# Patient Record
Sex: FEMALE | Race: WHITE | NOT HISPANIC OR LATINO
[De-identification: names, ages, dates, MRNs, and addresses within clinical notes are randomized per-mention and may not be internally consistent; named-entity substitution may affect disease eponyms.]

---

## 2017-02-13 ENCOUNTER — APPOINTMENT (OUTPATIENT)
Dept: GYNECOLOGIC ONCOLOGY | Facility: CLINIC | Age: 62
End: 2017-02-13

## 2017-02-13 VITALS
SYSTOLIC BLOOD PRESSURE: 122 MMHG | DIASTOLIC BLOOD PRESSURE: 84 MMHG | HEIGHT: 65 IN | BODY MASS INDEX: 31.49 KG/M2 | WEIGHT: 189 LBS

## 2017-02-14 ENCOUNTER — OTHER (OUTPATIENT)
Age: 62
End: 2017-02-14

## 2017-02-14 LAB — CANCER AG125 SERPL-ACNC: 24 U/ML

## 2017-02-15 ENCOUNTER — OTHER (OUTPATIENT)
Age: 62
End: 2017-02-15

## 2017-03-15 ENCOUNTER — APPOINTMENT (OUTPATIENT)
Dept: GYNECOLOGIC ONCOLOGY | Facility: CLINIC | Age: 62
End: 2017-03-15

## 2017-04-17 ENCOUNTER — RX RENEWAL (OUTPATIENT)
Age: 62
End: 2017-04-17

## 2017-04-20 ENCOUNTER — OTHER (OUTPATIENT)
Age: 62
End: 2017-04-20

## 2017-04-21 ENCOUNTER — OTHER (OUTPATIENT)
Age: 62
End: 2017-04-21

## 2017-04-21 RX ORDER — CLOBETASOL PROPIONATE 0.5 MG/G
0.05 AEROSOL, FOAM TOPICAL DAILY
Qty: 30 | Refills: 0 | Status: DISCONTINUED | COMMUNITY
Start: 2017-02-15 | End: 2017-04-21

## 2017-06-12 ENCOUNTER — APPOINTMENT (OUTPATIENT)
Dept: GYNECOLOGIC ONCOLOGY | Facility: CLINIC | Age: 62
End: 2017-06-12

## 2017-06-12 VITALS
WEIGHT: 188 LBS | BODY MASS INDEX: 31.32 KG/M2 | HEIGHT: 65 IN | SYSTOLIC BLOOD PRESSURE: 124 MMHG | DIASTOLIC BLOOD PRESSURE: 82 MMHG

## 2017-06-14 LAB — CANCER AG125 SERPL-ACNC: 22 U/ML

## 2017-08-10 ENCOUNTER — RX RENEWAL (OUTPATIENT)
Age: 62
End: 2017-08-10

## 2017-08-11 ENCOUNTER — RX RENEWAL (OUTPATIENT)
Age: 62
End: 2017-08-11

## 2017-09-12 ENCOUNTER — APPOINTMENT (OUTPATIENT)
Dept: GYNECOLOGIC ONCOLOGY | Facility: CLINIC | Age: 62
End: 2017-09-12
Payer: COMMERCIAL

## 2017-09-12 VITALS
HEIGHT: 65 IN | BODY MASS INDEX: 32.49 KG/M2 | WEIGHT: 195 LBS | DIASTOLIC BLOOD PRESSURE: 88 MMHG | SYSTOLIC BLOOD PRESSURE: 138 MMHG

## 2017-09-12 PROCEDURE — 99214 OFFICE O/P EST MOD 30 MIN: CPT

## 2017-09-12 RX ORDER — ALPRAZOLAM 0.25 MG/1
0.25 TABLET ORAL
Qty: 30 | Refills: 0 | Status: DISCONTINUED | COMMUNITY
Start: 2016-09-07 | End: 2017-09-12

## 2017-09-14 LAB — CANCER AG125 SERPL-ACNC: 22 U/ML

## 2018-02-07 ENCOUNTER — APPOINTMENT (OUTPATIENT)
Dept: GYNECOLOGIC ONCOLOGY | Facility: CLINIC | Age: 63
End: 2018-02-07
Payer: MEDICAID

## 2018-02-07 VITALS
DIASTOLIC BLOOD PRESSURE: 90 MMHG | BODY MASS INDEX: 32.32 KG/M2 | HEIGHT: 65 IN | SYSTOLIC BLOOD PRESSURE: 142 MMHG | WEIGHT: 194 LBS

## 2018-02-07 PROCEDURE — 99213 OFFICE O/P EST LOW 20 MIN: CPT

## 2018-02-08 LAB — CANCER AG125 SERPL-ACNC: 20 U/ML

## 2018-05-03 ENCOUNTER — EMERGENCY (EMERGENCY)
Facility: HOSPITAL | Age: 63
LOS: 1 days | End: 2018-05-03
Payer: MEDICAID

## 2018-05-03 DIAGNOSIS — Z98.89 OTHER SPECIFIED POSTPROCEDURAL STATES: Chronic | ICD-10-CM

## 2018-05-03 PROCEDURE — 99283 EMERGENCY DEPT VISIT LOW MDM: CPT

## 2018-06-20 ENCOUNTER — APPOINTMENT (OUTPATIENT)
Dept: GYNECOLOGIC ONCOLOGY | Facility: CLINIC | Age: 63
End: 2018-06-20
Payer: MEDICAID

## 2018-06-20 VITALS
WEIGHT: 193 LBS | HEIGHT: 65 IN | DIASTOLIC BLOOD PRESSURE: 98 MMHG | SYSTOLIC BLOOD PRESSURE: 134 MMHG | BODY MASS INDEX: 32.15 KG/M2

## 2018-06-20 DIAGNOSIS — R06.00 DYSPNEA, UNSPECIFIED: ICD-10-CM

## 2018-06-20 DIAGNOSIS — Z87.898 PERSONAL HISTORY OF OTHER SPECIFIED CONDITIONS: ICD-10-CM

## 2018-06-20 PROCEDURE — 99213 OFFICE O/P EST LOW 20 MIN: CPT

## 2018-06-21 LAB — CANCER AG125 SERPL-ACNC: 24 U/ML

## 2018-12-12 ENCOUNTER — APPOINTMENT (OUTPATIENT)
Dept: GYNECOLOGIC ONCOLOGY | Facility: CLINIC | Age: 63
End: 2018-12-12
Payer: MEDICAID

## 2018-12-12 VITALS
BODY MASS INDEX: 32.82 KG/M2 | SYSTOLIC BLOOD PRESSURE: 132 MMHG | HEIGHT: 65 IN | DIASTOLIC BLOOD PRESSURE: 88 MMHG | WEIGHT: 197 LBS

## 2018-12-12 PROCEDURE — 99214 OFFICE O/P EST MOD 30 MIN: CPT

## 2018-12-14 LAB — CANCER AG125 SERPL-ACNC: 23 U/ML

## 2019-02-15 ENCOUNTER — EMERGENCY (EMERGENCY)
Facility: HOSPITAL | Age: 64
LOS: 1 days | End: 2019-02-15
Admitting: EMERGENCY MEDICINE
Payer: COMMERCIAL

## 2019-02-15 DIAGNOSIS — Z98.89 OTHER SPECIFIED POSTPROCEDURAL STATES: Chronic | ICD-10-CM

## 2019-02-15 PROCEDURE — 99284 EMERGENCY DEPT VISIT MOD MDM: CPT | Mod: 25

## 2019-02-15 PROCEDURE — 99053 MED SERV 10PM-8AM 24 HR FAC: CPT

## 2019-02-15 PROCEDURE — 73562 X-RAY EXAM OF KNEE 3: CPT | Mod: 26,RT

## 2019-04-19 ENCOUNTER — APPOINTMENT (OUTPATIENT)
Dept: MAMMOGRAPHY | Facility: CLINIC | Age: 64
End: 2019-04-19
Payer: MEDICAID

## 2019-04-19 PROCEDURE — 77063 BREAST TOMOSYNTHESIS BI: CPT

## 2019-04-19 PROCEDURE — 77067 SCR MAMMO BI INCL CAD: CPT

## 2019-06-12 ENCOUNTER — APPOINTMENT (OUTPATIENT)
Dept: GYNECOLOGIC ONCOLOGY | Facility: CLINIC | Age: 64
End: 2019-06-12
Payer: MEDICAID

## 2019-06-12 VITALS
DIASTOLIC BLOOD PRESSURE: 78 MMHG | BODY MASS INDEX: 31.82 KG/M2 | HEIGHT: 66 IN | WEIGHT: 198 LBS | SYSTOLIC BLOOD PRESSURE: 114 MMHG

## 2019-06-12 PROCEDURE — 99213 OFFICE O/P EST LOW 20 MIN: CPT

## 2019-06-12 NOTE — HISTORY OF PRESENT ILLNESS
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 \par ELAP, SAM, BSO and staging 8/3/16.\par No adjuvant therapy. \par \par Returns for surveillance visit. \par Feels well, denies bleeding, pain or other symptoms of disease. \par \par Last mammogram April 2019 was BIRADS 1. \par \par  = 23 12/12/18

## 2019-06-12 NOTE — PHYSICAL EXAM
[Absent] : Adnexa(ae): Absent [Normal] : Recto-Vaginal Exam: Normal [Fully active, able to carry on all pre-disease performance without restriction] : Status 0 - Fully active, able to carry on all pre-disease performance without restriction [de-identified] : well healed vertical midline scar

## 2019-06-14 LAB — CANCER AG125 SERPL-ACNC: 20 U/ML

## 2019-06-27 ENCOUNTER — OUTPATIENT (OUTPATIENT)
Dept: OUTPATIENT SERVICES | Facility: HOSPITAL | Age: 64
LOS: 1 days | Discharge: HOME | End: 2019-06-27
Payer: COMMERCIAL

## 2019-06-27 VITALS
DIASTOLIC BLOOD PRESSURE: 84 MMHG | WEIGHT: 202.38 LBS | SYSTOLIC BLOOD PRESSURE: 157 MMHG | RESPIRATION RATE: 18 BRPM | TEMPERATURE: 98 F | HEART RATE: 73 BPM | OXYGEN SATURATION: 98 % | HEIGHT: 66 IN

## 2019-06-27 DIAGNOSIS — Z90.710 ACQUIRED ABSENCE OF BOTH CERVIX AND UTERUS: Chronic | ICD-10-CM

## 2019-06-27 DIAGNOSIS — R19.4 CHANGE IN BOWEL HABIT: ICD-10-CM

## 2019-06-27 DIAGNOSIS — Z98.890 OTHER SPECIFIED POSTPROCEDURAL STATES: Chronic | ICD-10-CM

## 2019-06-27 DIAGNOSIS — Z98.89 OTHER SPECIFIED POSTPROCEDURAL STATES: Chronic | ICD-10-CM

## 2019-06-27 LAB
ANION GAP SERPL CALC-SCNC: 8 MMOL/L — SIGNIFICANT CHANGE UP (ref 5–17)
BASOPHILS # BLD AUTO: 0.03 K/UL — SIGNIFICANT CHANGE UP (ref 0–0.2)
BASOPHILS NFR BLD AUTO: 0.7 % — SIGNIFICANT CHANGE UP (ref 0–2)
BUN SERPL-MCNC: 13 MG/DL — SIGNIFICANT CHANGE UP (ref 7–23)
CALCIUM SERPL-MCNC: 10.9 MG/DL — HIGH (ref 8.5–10.1)
CHLORIDE SERPL-SCNC: 107 MMOL/L — SIGNIFICANT CHANGE UP (ref 96–108)
CO2 SERPL-SCNC: 26 MMOL/L — SIGNIFICANT CHANGE UP (ref 22–31)
CREAT SERPL-MCNC: 0.6 MG/DL — SIGNIFICANT CHANGE UP (ref 0.5–1.3)
EOSINOPHIL # BLD AUTO: 0.12 K/UL — SIGNIFICANT CHANGE UP (ref 0–0.5)
EOSINOPHIL NFR BLD AUTO: 2.7 % — SIGNIFICANT CHANGE UP (ref 0–6)
GLUCOSE SERPL-MCNC: 88 MG/DL — SIGNIFICANT CHANGE UP (ref 70–99)
HCT VFR BLD CALC: 40.4 % — SIGNIFICANT CHANGE UP (ref 34.5–45)
HGB BLD-MCNC: 13.6 G/DL — SIGNIFICANT CHANGE UP (ref 11.5–15.5)
IMM GRANULOCYTES NFR BLD AUTO: 0.2 % — SIGNIFICANT CHANGE UP (ref 0–1.5)
LYMPHOCYTES # BLD AUTO: 1.34 K/UL — SIGNIFICANT CHANGE UP (ref 1–3.3)
LYMPHOCYTES # BLD AUTO: 29.8 % — SIGNIFICANT CHANGE UP (ref 13–44)
MCHC RBC-ENTMCNC: 33 PG — SIGNIFICANT CHANGE UP (ref 27–34)
MCHC RBC-ENTMCNC: 33.7 GM/DL — SIGNIFICANT CHANGE UP (ref 32–36)
MCV RBC AUTO: 98.1 FL — SIGNIFICANT CHANGE UP (ref 80–100)
MONOCYTES # BLD AUTO: 0.37 K/UL — SIGNIFICANT CHANGE UP (ref 0–0.9)
MONOCYTES NFR BLD AUTO: 8.2 % — SIGNIFICANT CHANGE UP (ref 2–14)
NEUTROPHILS # BLD AUTO: 2.63 K/UL — SIGNIFICANT CHANGE UP (ref 1.8–7.4)
NEUTROPHILS NFR BLD AUTO: 58.4 % — SIGNIFICANT CHANGE UP (ref 43–77)
PLATELET # BLD AUTO: 168 K/UL — SIGNIFICANT CHANGE UP (ref 150–400)
POTASSIUM SERPL-MCNC: 4.8 MMOL/L — SIGNIFICANT CHANGE UP (ref 3.5–5.3)
POTASSIUM SERPL-SCNC: 4.8 MMOL/L — SIGNIFICANT CHANGE UP (ref 3.5–5.3)
RBC # BLD: 4.12 M/UL — SIGNIFICANT CHANGE UP (ref 3.8–5.2)
RBC # FLD: 12.1 % — SIGNIFICANT CHANGE UP (ref 10.3–14.5)
SODIUM SERPL-SCNC: 141 MMOL/L — SIGNIFICANT CHANGE UP (ref 135–145)
WBC # BLD: 4.5 K/UL — SIGNIFICANT CHANGE UP (ref 3.8–10.5)
WBC # FLD AUTO: 4.5 K/UL — SIGNIFICANT CHANGE UP (ref 3.8–10.5)

## 2019-06-27 PROCEDURE — 93010 ELECTROCARDIOGRAM REPORT: CPT

## 2019-06-27 NOTE — H&P PST ADULT - NSICDXPASTSURGICALHX_GEN_ALL_CORE_FT
PAST SURGICAL HISTORY:  s/p (R) ACL repair     s/p surgical correction of pleural effusion 30 years ago and Chest tube x15 days initially     S/P SAM-BSO 8/2016    S/P wrist surgery ORIF right wrist 2012

## 2019-06-27 NOTE — H&P PST ADULT - HISTORY OF PRESENT ILLNESS
This is a 65 y/o female with a PMH of HTN, ovarian cancer S/P SAM-BSO (no chemo or radiation), GERD who reports she had her last colonoscopy 5 years ago which was negative at that time. She is now C/O of some lower abdominal discomfort. She is scheduled for a follow up colonoscopy This is a 65 y/o female with a PMH of HTN, ovarian cancer S/P SAM-BSO (no chemo or radiation), GERD who reports she had her last colonoscopy 5 years ago which was negative at that time. She is now C/O of some lower abdominal discomfort. Denies any N/V/D or constipation. She is scheduled for a follow up colonoscopy This is a 65 y/o female with a PMH of HTN, ovarian cancer S/P SAM-BSO (no chemo or radiation), GERD who reports she had her last colonoscopy 4 years ago which was negative at that time. She is now C/O of some lower abdominal discomfort. Denies any N/V/ or constipation but does report some diarrhea at times.  She is scheduled for a follow up colonoscopy

## 2019-06-27 NOTE — H&P PST ADULT - NSICDXPASTMEDICALHX_GEN_ALL_CORE_FT
PAST MEDICAL HISTORY:  GERD (gastroesophageal reflux disease)     HTN (hypertension)     Hypothyroidism     Mass of ovary     Ovarian cancer S/P SAM-BSO no chemo or radiation  2016    Pleural effusion right lung, 1984    Right knee pain

## 2019-06-27 NOTE — H&P PST ADULT - ASSESSMENT
This is a 63 y/o female with lower abdominal pain who is scheduled for a for colonoscopy     Plan    1. NPO after midnight  2. Patient instructed to follow instructions from Dr. Barragan for bowel prep and day of procedure medications

## 2019-07-02 ENCOUNTER — RESULT REVIEW (OUTPATIENT)
Age: 64
End: 2019-07-02

## 2019-07-02 ENCOUNTER — OUTPATIENT (OUTPATIENT)
Dept: OUTPATIENT SERVICES | Facility: HOSPITAL | Age: 64
LOS: 1 days | Discharge: HOME | End: 2019-07-02
Payer: COMMERCIAL

## 2019-07-02 VITALS
TEMPERATURE: 99 F | DIASTOLIC BLOOD PRESSURE: 104 MMHG | OXYGEN SATURATION: 99 % | SYSTOLIC BLOOD PRESSURE: 154 MMHG | WEIGHT: 202.38 LBS | HEIGHT: 66 IN | RESPIRATION RATE: 19 BRPM | HEART RATE: 63 BPM

## 2019-07-02 DIAGNOSIS — Z90.710 ACQUIRED ABSENCE OF BOTH CERVIX AND UTERUS: Chronic | ICD-10-CM

## 2019-07-02 DIAGNOSIS — Z98.89 OTHER SPECIFIED POSTPROCEDURAL STATES: Chronic | ICD-10-CM

## 2019-07-02 PROCEDURE — 88305 TISSUE EXAM BY PATHOLOGIST: CPT | Mod: 26

## 2019-07-02 NOTE — ASU PATIENT PROFILE, ADULT - PMH
GERD (gastroesophageal reflux disease)    HTN (hypertension)    Hypothyroidism    Mass of ovary    Ovarian cancer  S/P SAM-BSO no chemo or radiation  2016  Pleural effusion  right lung, 1984  Right knee pain

## 2019-07-02 NOTE — ASU PATIENT PROFILE, ADULT - PSH
s/p (R) ACL repair    s/p surgical correction of pleural effusion 30 years ago and Chest tube x15 days initially    S/P SAM-BSO  8/2016  S/P wrist surgery  ORIF right wrist 2012

## 2019-07-05 LAB — SURGICAL PATHOLOGY STUDY: SIGNIFICANT CHANGE UP

## 2019-07-08 DIAGNOSIS — K57.30 DIVERTICULOSIS OF LARGE INTESTINE WITHOUT PERFORATION OR ABSCESS WITHOUT BLEEDING: ICD-10-CM

## 2019-07-08 DIAGNOSIS — I10 ESSENTIAL (PRIMARY) HYPERTENSION: ICD-10-CM

## 2019-07-08 DIAGNOSIS — K64.8 OTHER HEMORRHOIDS: ICD-10-CM

## 2019-07-08 DIAGNOSIS — E03.9 HYPOTHYROIDISM, UNSPECIFIED: ICD-10-CM

## 2019-07-08 DIAGNOSIS — Z88.5 ALLERGY STATUS TO NARCOTIC AGENT: ICD-10-CM

## 2019-07-08 DIAGNOSIS — K63.5 POLYP OF COLON: ICD-10-CM

## 2019-07-08 DIAGNOSIS — Z85.43 PERSONAL HISTORY OF MALIGNANT NEOPLASM OF OVARY: ICD-10-CM

## 2019-07-08 DIAGNOSIS — R19.7 DIARRHEA, UNSPECIFIED: ICD-10-CM

## 2019-07-08 DIAGNOSIS — K21.9 GASTRO-ESOPHAGEAL REFLUX DISEASE WITHOUT ESOPHAGITIS: ICD-10-CM

## 2019-07-08 DIAGNOSIS — Z87.891 PERSONAL HISTORY OF NICOTINE DEPENDENCE: ICD-10-CM

## 2019-08-15 ENCOUNTER — EMERGENCY (EMERGENCY)
Facility: HOSPITAL | Age: 64
LOS: 1 days | End: 2019-08-15
Admitting: SURGERY
Payer: COMMERCIAL

## 2019-08-15 DIAGNOSIS — Z98.89 OTHER SPECIFIED POSTPROCEDURAL STATES: Chronic | ICD-10-CM

## 2019-08-15 DIAGNOSIS — Z90.710 ACQUIRED ABSENCE OF BOTH CERVIX AND UTERUS: Chronic | ICD-10-CM

## 2019-08-15 PROCEDURE — 76705 ECHO EXAM OF ABDOMEN: CPT | Mod: 26

## 2019-08-15 PROCEDURE — 99284 EMERGENCY DEPT VISIT MOD MDM: CPT

## 2019-08-15 PROCEDURE — 71045 X-RAY EXAM CHEST 1 VIEW: CPT | Mod: 26

## 2019-08-15 PROCEDURE — 74018 RADEX ABDOMEN 1 VIEW: CPT | Mod: 26

## 2019-08-15 PROCEDURE — 93010 ELECTROCARDIOGRAM REPORT: CPT

## 2019-08-15 PROCEDURE — 74177 CT ABD & PELVIS W/CONTRAST: CPT | Mod: 26

## 2019-08-23 ENCOUNTER — OUTPATIENT (OUTPATIENT)
Dept: OUTPATIENT SERVICES | Facility: HOSPITAL | Age: 64
LOS: 1 days | End: 2019-08-23

## 2019-08-23 DIAGNOSIS — Z98.89 OTHER SPECIFIED POSTPROCEDURAL STATES: Chronic | ICD-10-CM

## 2019-08-23 DIAGNOSIS — Z90.710 ACQUIRED ABSENCE OF BOTH CERVIX AND UTERUS: Chronic | ICD-10-CM

## 2019-09-06 ENCOUNTER — OUTPATIENT (OUTPATIENT)
Dept: OUTPATIENT SERVICES | Facility: HOSPITAL | Age: 64
LOS: 1 days | End: 2019-09-06

## 2019-09-06 DIAGNOSIS — Z98.89 OTHER SPECIFIED POSTPROCEDURAL STATES: Chronic | ICD-10-CM

## 2019-09-06 DIAGNOSIS — Z90.710 ACQUIRED ABSENCE OF BOTH CERVIX AND UTERUS: Chronic | ICD-10-CM

## 2019-11-08 PROBLEM — C56.9 MALIGNANT NEOPLASM OF UNSPECIFIED OVARY: Chronic | Status: ACTIVE | Noted: 2019-06-27

## 2019-11-08 PROBLEM — M25.561 PAIN IN RIGHT KNEE: Chronic | Status: ACTIVE | Noted: 2019-06-27

## 2019-12-11 ENCOUNTER — APPOINTMENT (OUTPATIENT)
Dept: GYNECOLOGIC ONCOLOGY | Facility: CLINIC | Age: 64
End: 2019-12-11
Payer: MEDICAID

## 2019-12-11 VITALS — WEIGHT: 186 LBS | HEIGHT: 66 IN | BODY MASS INDEX: 29.89 KG/M2

## 2019-12-11 PROCEDURE — 99213 OFFICE O/P EST LOW 20 MIN: CPT

## 2019-12-11 NOTE — HISTORY OF PRESENT ILLNESS
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 \par ELAP, SAM, BSO and staging 8/3/16.\par No adjuvant therapy. \par \par Returns for surveillance visit. \par Feels well, denies bleeding, pain or other symptoms of disease. \par \par Last mammogram April 2019 was BIRADS 1. \par \par  = 20 6/14/19\par \par Had hernia surgery in September with robotic repair with mesh.

## 2019-12-11 NOTE — PHYSICAL EXAM
[Absent] : Adnexa(ae): Absent [Normal] : Anus and perineum: Normal sphincter tone, no masses, no prolapse. [Fully active, able to carry on all pre-disease performance without restriction] : Status 0 - Fully active, able to carry on all pre-disease performance without restriction [de-identified] : well healed vertical midline scar

## 2019-12-12 LAB — CANCER AG125 SERPL-ACNC: 17 U/ML

## 2019-12-29 DIAGNOSIS — M25.561 PAIN IN RIGHT KNEE: ICD-10-CM

## 2020-02-12 ENCOUNTER — APPOINTMENT (OUTPATIENT)
Dept: ORTHOPEDIC SURGERY | Facility: CLINIC | Age: 65
End: 2020-02-12

## 2020-06-17 ENCOUNTER — APPOINTMENT (OUTPATIENT)
Dept: GYNECOLOGIC ONCOLOGY | Facility: CLINIC | Age: 65
End: 2020-06-17
Payer: COMMERCIAL

## 2020-06-17 VITALS
HEIGHT: 66 IN | DIASTOLIC BLOOD PRESSURE: 95 MMHG | HEART RATE: 79 BPM | BODY MASS INDEX: 31.5 KG/M2 | SYSTOLIC BLOOD PRESSURE: 152 MMHG | WEIGHT: 196 LBS

## 2020-06-17 PROCEDURE — 99213 OFFICE O/P EST LOW 20 MIN: CPT

## 2020-06-17 NOTE — PHYSICAL EXAM
[Absent] : Adnexa(ae): Absent [Normal] : Recto-Vaginal Exam: Normal [Fully active, able to carry on all pre-disease performance without restriction] : Status 0 - Fully active, able to carry on all pre-disease performance without restriction [de-identified] : well healed vertical midline scar

## 2020-06-17 NOTE — HISTORY OF PRESENT ILLNESS
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 \par ELAP, SAM, BSO and staging 8/3/16.\par No adjuvant therapy. \par \par Returns for surveillance visit. \par Feels well, denies bleeding, pain or other symptoms of disease. \par \par Last mammogram April 2019 was BIRADS 1. \par \par  = 17 12/12/19

## 2020-06-18 LAB
CANCER AG125 SERPL-ACNC: 19 U/ML
SARS-COV-2 IGG SERPL IA-ACNC: 0.17 INDEX
SARS-COV-2 IGG SERPL QL IA: NEGATIVE

## 2020-06-24 ENCOUNTER — APPOINTMENT (OUTPATIENT)
Dept: MAMMOGRAPHY | Facility: CLINIC | Age: 65
End: 2020-06-24
Payer: COMMERCIAL

## 2020-06-24 ENCOUNTER — RESULT REVIEW (OUTPATIENT)
Age: 65
End: 2020-06-24

## 2020-06-24 PROCEDURE — 77063 BREAST TOMOSYNTHESIS BI: CPT

## 2020-06-24 PROCEDURE — 77067 SCR MAMMO BI INCL CAD: CPT

## 2020-08-21 NOTE — ASU PREOP CHECKLIST - LOOSE TEETH
Quality 110: Preventive Care And Screening: Influenza Immunization: Influenza immunization was not ordered or administered, reason not given no

## 2020-11-09 ENCOUNTER — NON-APPOINTMENT (OUTPATIENT)
Age: 65
End: 2020-11-09

## 2020-12-28 ENCOUNTER — OUTPATIENT (OUTPATIENT)
Dept: OUTPATIENT SERVICES | Facility: HOSPITAL | Age: 65
LOS: 1 days | End: 2020-12-28
Payer: COMMERCIAL

## 2020-12-28 DIAGNOSIS — Z90.710 ACQUIRED ABSENCE OF BOTH CERVIX AND UTERUS: Chronic | ICD-10-CM

## 2020-12-28 DIAGNOSIS — Z98.89 OTHER SPECIFIED POSTPROCEDURAL STATES: Chronic | ICD-10-CM

## 2020-12-28 PROCEDURE — 93010 ELECTROCARDIOGRAM REPORT: CPT

## 2020-12-30 ENCOUNTER — APPOINTMENT (OUTPATIENT)
Dept: GYNECOLOGIC ONCOLOGY | Facility: CLINIC | Age: 65
End: 2020-12-30
Payer: COMMERCIAL

## 2020-12-30 VITALS
SYSTOLIC BLOOD PRESSURE: 134 MMHG | DIASTOLIC BLOOD PRESSURE: 94 MMHG | WEIGHT: 195 LBS | HEART RATE: 85 BPM | BODY MASS INDEX: 31.47 KG/M2 | OXYGEN SATURATION: 99 % | TEMPERATURE: 97.5 F

## 2020-12-30 PROCEDURE — 99072 ADDL SUPL MATRL&STAF TM PHE: CPT

## 2020-12-30 PROCEDURE — 99213 OFFICE O/P EST LOW 20 MIN: CPT

## 2020-12-30 NOTE — HISTORY OF PRESENT ILLNESS
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 \par ELAP, SAM, BSO and staging 8/3/16.\par No adjuvant therapy. \par \par Returns for surveillance visit. \par Feels well, denies bleeding, pain or other symptoms of disease. \par \par Last mammogram June 2020 was BIRADS 1. \par \par  = 19 6/17/2020\par \par Scheduled to have a right knee replacement in January.

## 2020-12-31 LAB — CANCER AG125 SERPL-ACNC: 20 U/ML

## 2021-01-11 ENCOUNTER — APPOINTMENT (OUTPATIENT)
Dept: DISASTER EMERGENCY | Facility: CLINIC | Age: 66
End: 2021-01-11

## 2021-01-11 DIAGNOSIS — Z01.818 ENCOUNTER FOR OTHER PREPROCEDURAL EXAMINATION: ICD-10-CM

## 2021-01-13 LAB — SARS-COV-2 N GENE NPH QL NAA+PROBE: NOT DETECTED

## 2021-01-14 ENCOUNTER — OUTPATIENT (OUTPATIENT)
Dept: OUTPATIENT SERVICES | Facility: HOSPITAL | Age: 66
LOS: 1 days | End: 2021-01-14

## 2021-01-14 ENCOUNTER — INPATIENT (INPATIENT)
Facility: HOSPITAL | Age: 66
LOS: 1 days | Discharge: HOME HEALTH | End: 2021-01-16
Payer: COMMERCIAL

## 2021-01-14 DIAGNOSIS — Z98.89 OTHER SPECIFIED POSTPROCEDURAL STATES: Chronic | ICD-10-CM

## 2021-01-14 DIAGNOSIS — Z90.710 ACQUIRED ABSENCE OF BOTH CERVIX AND UTERUS: Chronic | ICD-10-CM

## 2021-01-14 PROCEDURE — 73560 X-RAY EXAM OF KNEE 1 OR 2: CPT | Mod: 26,RT

## 2021-01-15 ENCOUNTER — OUTPATIENT (OUTPATIENT)
Dept: OUTPATIENT SERVICES | Facility: HOSPITAL | Age: 66
LOS: 1 days | End: 2021-01-15

## 2021-01-15 DIAGNOSIS — Z90.710 ACQUIRED ABSENCE OF BOTH CERVIX AND UTERUS: Chronic | ICD-10-CM

## 2021-01-15 DIAGNOSIS — Z98.89 OTHER SPECIFIED POSTPROCEDURAL STATES: Chronic | ICD-10-CM

## 2021-01-16 ENCOUNTER — OUTPATIENT (OUTPATIENT)
Dept: OUTPATIENT SERVICES | Facility: HOSPITAL | Age: 66
LOS: 1 days | End: 2021-01-16

## 2021-01-16 DIAGNOSIS — Z90.710 ACQUIRED ABSENCE OF BOTH CERVIX AND UTERUS: Chronic | ICD-10-CM

## 2021-01-16 DIAGNOSIS — Z98.89 OTHER SPECIFIED POSTPROCEDURAL STATES: Chronic | ICD-10-CM

## 2021-03-19 ENCOUNTER — APPOINTMENT (OUTPATIENT)
Dept: CT IMAGING | Facility: CLINIC | Age: 66
End: 2021-03-19
Payer: COMMERCIAL

## 2021-03-19 PROCEDURE — Q9967B: CUSTOM

## 2021-03-19 PROCEDURE — 82565A: CUSTOM | Mod: QW

## 2021-03-19 PROCEDURE — 74177 CT ABD & PELVIS W/CONTRAST: CPT

## 2021-07-21 ENCOUNTER — APPOINTMENT (OUTPATIENT)
Dept: GYNECOLOGIC ONCOLOGY | Facility: CLINIC | Age: 66
End: 2021-07-21
Payer: COMMERCIAL

## 2021-07-21 VITALS
BODY MASS INDEX: 30.4 KG/M2 | SYSTOLIC BLOOD PRESSURE: 146 MMHG | OXYGEN SATURATION: 97 % | HEIGHT: 66 IN | WEIGHT: 189.13 LBS | DIASTOLIC BLOOD PRESSURE: 94 MMHG | HEART RATE: 70 BPM

## 2021-07-21 PROCEDURE — 99213 OFFICE O/P EST LOW 20 MIN: CPT

## 2021-07-22 LAB — CANCER AG125 SERPL-ACNC: 22 U/ML

## 2021-08-04 ENCOUNTER — TRANSCRIPTION ENCOUNTER (OUTPATIENT)
Age: 66
End: 2021-08-04

## 2021-08-18 ENCOUNTER — APPOINTMENT (OUTPATIENT)
Dept: OPHTHALMOLOGY | Facility: CLINIC | Age: 66
End: 2021-08-18

## 2021-09-28 ENCOUNTER — NON-APPOINTMENT (OUTPATIENT)
Age: 66
End: 2021-09-28

## 2021-09-28 ENCOUNTER — APPOINTMENT (OUTPATIENT)
Dept: OPHTHALMOLOGY | Facility: CLINIC | Age: 66
End: 2021-09-28
Payer: COMMERCIAL

## 2021-09-28 PROCEDURE — 92015 DETERMINE REFRACTIVE STATE: CPT | Mod: NC

## 2021-09-28 PROCEDURE — 99072 ADDL SUPL MATRL&STAF TM PHE: CPT

## 2021-09-28 PROCEDURE — 92014 COMPRE OPH EXAM EST PT 1/>: CPT

## 2021-10-26 ENCOUNTER — NON-APPOINTMENT (OUTPATIENT)
Age: 66
End: 2021-10-26

## 2021-10-26 ENCOUNTER — APPOINTMENT (OUTPATIENT)
Dept: OPHTHALMOLOGY | Facility: CLINIC | Age: 66
End: 2021-10-26
Payer: COMMERCIAL

## 2021-10-26 ENCOUNTER — APPOINTMENT (OUTPATIENT)
Dept: OPHTHALMOLOGY | Facility: CLINIC | Age: 66
End: 2021-10-26

## 2021-10-26 PROCEDURE — 92014 COMPRE OPH EXAM EST PT 1/>: CPT

## 2021-11-24 ENCOUNTER — NON-APPOINTMENT (OUTPATIENT)
Age: 66
End: 2021-11-24

## 2021-11-24 ENCOUNTER — APPOINTMENT (OUTPATIENT)
Dept: OPHTHALMOLOGY | Facility: CLINIC | Age: 66
End: 2021-11-24
Payer: COMMERCIAL

## 2021-11-24 PROCEDURE — 92083 EXTENDED VISUAL FIELD XM: CPT

## 2021-11-24 PROCEDURE — 92133 CPTRZD OPH DX IMG PST SGM ON: CPT

## 2021-12-07 ENCOUNTER — TRANSCRIPTION ENCOUNTER (OUTPATIENT)
Age: 66
End: 2021-12-07

## 2022-01-12 ENCOUNTER — RESULT REVIEW (OUTPATIENT)
Age: 67
End: 2022-01-12

## 2022-01-12 ENCOUNTER — APPOINTMENT (OUTPATIENT)
Dept: MAMMOGRAPHY | Facility: CLINIC | Age: 67
End: 2022-01-12
Payer: COMMERCIAL

## 2022-01-12 PROCEDURE — 77067 SCR MAMMO BI INCL CAD: CPT

## 2022-01-12 PROCEDURE — 77063 BREAST TOMOSYNTHESIS BI: CPT

## 2022-01-19 ENCOUNTER — APPOINTMENT (OUTPATIENT)
Dept: RADIOLOGY | Facility: CLINIC | Age: 67
End: 2022-01-19
Payer: COMMERCIAL

## 2022-01-19 PROCEDURE — 77080 DXA BONE DENSITY AXIAL: CPT

## 2022-01-25 ENCOUNTER — APPOINTMENT (OUTPATIENT)
Dept: GYNECOLOGIC ONCOLOGY | Facility: CLINIC | Age: 67
End: 2022-01-25
Payer: COMMERCIAL

## 2022-01-25 VITALS
SYSTOLIC BLOOD PRESSURE: 117 MMHG | WEIGHT: 195 LBS | DIASTOLIC BLOOD PRESSURE: 79 MMHG | BODY MASS INDEX: 31.34 KG/M2 | HEART RATE: 68 BPM | HEIGHT: 66 IN

## 2022-01-25 PROCEDURE — 99213 OFFICE O/P EST LOW 20 MIN: CPT

## 2022-01-31 LAB — CANCER AG125 SERPL-ACNC: 32 U/ML

## 2022-02-04 ENCOUNTER — NON-APPOINTMENT (OUTPATIENT)
Age: 67
End: 2022-02-04

## 2022-02-09 ENCOUNTER — OUTPATIENT (OUTPATIENT)
Dept: OUTPATIENT SERVICES | Facility: HOSPITAL | Age: 67
LOS: 1 days | End: 2022-02-09

## 2022-02-09 DIAGNOSIS — Z90.710 ACQUIRED ABSENCE OF BOTH CERVIX AND UTERUS: Chronic | ICD-10-CM

## 2022-02-09 DIAGNOSIS — Z98.89 OTHER SPECIFIED POSTPROCEDURAL STATES: Chronic | ICD-10-CM

## 2022-02-11 DIAGNOSIS — Z01.812 ENCOUNTER FOR PREPROCEDURAL LABORATORY EXAMINATION: ICD-10-CM

## 2022-02-11 DIAGNOSIS — I10 ESSENTIAL (PRIMARY) HYPERTENSION: ICD-10-CM

## 2022-02-11 DIAGNOSIS — M25.869 OTHER SPECIFIED JOINT DISORDERS, UNSPECIFIED KNEE: ICD-10-CM

## 2022-02-16 ENCOUNTER — OUTPATIENT (OUTPATIENT)
Dept: OUTPATIENT SERVICES | Facility: HOSPITAL | Age: 67
LOS: 1 days | End: 2022-02-16

## 2022-02-16 DIAGNOSIS — Z90.710 ACQUIRED ABSENCE OF BOTH CERVIX AND UTERUS: Chronic | ICD-10-CM

## 2022-02-16 DIAGNOSIS — Z98.89 OTHER SPECIFIED POSTPROCEDURAL STATES: Chronic | ICD-10-CM

## 2022-02-18 ENCOUNTER — NON-APPOINTMENT (OUTPATIENT)
Age: 67
End: 2022-02-18

## 2022-02-18 ENCOUNTER — APPOINTMENT (OUTPATIENT)
Dept: OPHTHALMOLOGY | Facility: CLINIC | Age: 67
End: 2022-02-18
Payer: MEDICARE

## 2022-02-18 DIAGNOSIS — E78.5 HYPERLIPIDEMIA, UNSPECIFIED: ICD-10-CM

## 2022-02-18 DIAGNOSIS — M24.661 ANKYLOSIS, RIGHT KNEE: ICD-10-CM

## 2022-02-18 DIAGNOSIS — K21.9 GASTRO-ESOPHAGEAL REFLUX DISEASE WITHOUT ESOPHAGITIS: ICD-10-CM

## 2022-02-18 DIAGNOSIS — I10 ESSENTIAL (PRIMARY) HYPERTENSION: ICD-10-CM

## 2022-02-18 DIAGNOSIS — Z96.651 PRESENCE OF RIGHT ARTIFICIAL KNEE JOINT: ICD-10-CM

## 2022-02-18 DIAGNOSIS — E07.9 DISORDER OF THYROID, UNSPECIFIED: ICD-10-CM

## 2022-02-18 PROCEDURE — 92014 COMPRE OPH EXAM EST PT 1/>: CPT

## 2022-02-25 ENCOUNTER — APPOINTMENT (OUTPATIENT)
Dept: GYNECOLOGIC ONCOLOGY | Facility: CLINIC | Age: 67
End: 2022-02-25
Payer: MEDICARE

## 2022-02-25 ENCOUNTER — LABORATORY RESULT (OUTPATIENT)
Age: 67
End: 2022-02-25

## 2022-02-25 PROCEDURE — 99212 OFFICE O/P EST SF 10 MIN: CPT

## 2022-03-02 LAB
BASOPHILS # BLD AUTO: 0.06 K/UL
BASOPHILS NFR BLD AUTO: 1.3 %
CANCER AG125 SERPL-ACNC: 35 U/ML
EOSINOPHIL # BLD AUTO: 0.11 K/UL
EOSINOPHIL NFR BLD AUTO: 2.4 %
HCT VFR BLD CALC: 39.1 %
HGB BLD-MCNC: 12.3 G/DL
IMM GRANULOCYTES NFR BLD AUTO: 0.2 %
LYMPHOCYTES # BLD AUTO: 1.3 K/UL
LYMPHOCYTES NFR BLD AUTO: 28.8 %
MAN DIFF?: NORMAL
MCHC RBC-ENTMCNC: 31.5 GM/DL
MCHC RBC-ENTMCNC: 33.2 PG
MCV RBC AUTO: 105.4 FL
MONOCYTES # BLD AUTO: 0.43 K/UL
MONOCYTES NFR BLD AUTO: 9.5 %
NEUTROPHILS # BLD AUTO: 2.61 K/UL
NEUTROPHILS NFR BLD AUTO: 57.8 %
PLATELET # BLD AUTO: 245 K/UL
RBC # BLD: 3.71 M/UL
RBC # FLD: 12.8 %
WBC # FLD AUTO: 4.52 K/UL

## 2022-03-03 ENCOUNTER — NON-APPOINTMENT (OUTPATIENT)
Age: 67
End: 2022-03-03

## 2022-03-11 ENCOUNTER — APPOINTMENT (OUTPATIENT)
Dept: OPHTHALMOLOGY | Facility: CLINIC | Age: 67
End: 2022-03-11
Payer: MEDICARE

## 2022-03-11 ENCOUNTER — NON-APPOINTMENT (OUTPATIENT)
Age: 67
End: 2022-03-11

## 2022-03-11 ENCOUNTER — APPOINTMENT (OUTPATIENT)
Dept: CT IMAGING | Facility: CLINIC | Age: 67
End: 2022-03-11
Payer: MEDICARE

## 2022-03-11 ENCOUNTER — RESULT REVIEW (OUTPATIENT)
Age: 67
End: 2022-03-11

## 2022-03-11 PROCEDURE — 74177 CT ABD & PELVIS W/CONTRAST: CPT

## 2022-03-11 PROCEDURE — 82565 ASSAY OF CREATININE: CPT | Mod: QW

## 2022-03-11 PROCEDURE — 92014 COMPRE OPH EXAM EST PT 1/>: CPT

## 2022-03-11 PROCEDURE — 71260 CT THORAX DX C+: CPT

## 2022-03-11 PROCEDURE — 92136 OPHTHALMIC BIOMETRY: CPT

## 2022-03-29 ENCOUNTER — APPOINTMENT (OUTPATIENT)
Dept: NUCLEAR MEDICINE | Facility: CLINIC | Age: 67
End: 2022-03-29
Payer: MEDICARE

## 2022-03-29 PROCEDURE — A9552B: CUSTOM

## 2022-03-29 PROCEDURE — 78815 PET IMAGE W/CT SKULL-THIGH: CPT | Mod: PS

## 2022-03-30 ENCOUNTER — NON-APPOINTMENT (OUTPATIENT)
Age: 67
End: 2022-03-30

## 2022-04-05 ENCOUNTER — APPOINTMENT (OUTPATIENT)
Dept: GYNECOLOGIC ONCOLOGY | Facility: CLINIC | Age: 67
End: 2022-04-05
Payer: MEDICARE

## 2022-04-05 VITALS
SYSTOLIC BLOOD PRESSURE: 135 MMHG | BODY MASS INDEX: 31.34 KG/M2 | WEIGHT: 195 LBS | HEART RATE: 110 BPM | DIASTOLIC BLOOD PRESSURE: 90 MMHG | HEIGHT: 66 IN

## 2022-04-05 PROCEDURE — 99215 OFFICE O/P EST HI 40 MIN: CPT

## 2022-04-05 RX ORDER — LORAZEPAM 1 MG/1
1 TABLET ORAL
Refills: 0 | Status: DISCONTINUED | COMMUNITY
End: 2022-04-05

## 2022-04-05 RX ORDER — DIAZEPAM 5 MG/1
5 TABLET ORAL
Qty: 4 | Refills: 0 | Status: DISCONTINUED | COMMUNITY
Start: 2016-11-16 | End: 2022-04-05

## 2022-04-13 ENCOUNTER — APPOINTMENT (OUTPATIENT)
Dept: OPHTHALMOLOGY | Facility: HOSPITAL | Age: 67
End: 2022-04-13
Payer: MEDICARE

## 2022-04-13 ENCOUNTER — OUTPATIENT (OUTPATIENT)
Dept: OUTPATIENT SERVICES | Facility: HOSPITAL | Age: 67
LOS: 1 days | End: 2022-04-13

## 2022-04-13 ENCOUNTER — NON-APPOINTMENT (OUTPATIENT)
Age: 67
End: 2022-04-13

## 2022-04-13 DIAGNOSIS — Z98.89 OTHER SPECIFIED POSTPROCEDURAL STATES: Chronic | ICD-10-CM

## 2022-04-13 DIAGNOSIS — Z90.710 ACQUIRED ABSENCE OF BOTH CERVIX AND UTERUS: Chronic | ICD-10-CM

## 2022-04-13 PROCEDURE — 66984 XCAPSL CTRC RMVL W/O ECP: CPT | Mod: LT

## 2022-04-14 ENCOUNTER — APPOINTMENT (OUTPATIENT)
Dept: OPHTHALMOLOGY | Facility: CLINIC | Age: 67
End: 2022-04-14
Payer: MEDICARE

## 2022-04-14 ENCOUNTER — NON-APPOINTMENT (OUTPATIENT)
Age: 67
End: 2022-04-14

## 2022-04-14 PROCEDURE — 99024 POSTOP FOLLOW-UP VISIT: CPT

## 2022-04-15 DIAGNOSIS — H25.12 AGE-RELATED NUCLEAR CATARACT, LEFT EYE: ICD-10-CM

## 2022-04-20 DIAGNOSIS — M17.11 UNILATERAL PRIMARY OSTEOARTHRITIS, RIGHT KNEE: ICD-10-CM

## 2022-04-22 ENCOUNTER — NON-APPOINTMENT (OUTPATIENT)
Age: 67
End: 2022-04-22

## 2022-04-22 ENCOUNTER — APPOINTMENT (OUTPATIENT)
Dept: OPHTHALMOLOGY | Facility: CLINIC | Age: 67
End: 2022-04-22
Payer: MEDICARE

## 2022-04-22 PROCEDURE — 99024 POSTOP FOLLOW-UP VISIT: CPT

## 2022-04-25 ENCOUNTER — APPOINTMENT (OUTPATIENT)
Dept: OPHTHALMOLOGY | Facility: CLINIC | Age: 67
End: 2022-04-25
Payer: MEDICARE

## 2022-04-25 ENCOUNTER — NON-APPOINTMENT (OUTPATIENT)
Age: 67
End: 2022-04-25

## 2022-04-25 PROCEDURE — 92014 COMPRE OPH EXAM EST PT 1/>: CPT | Mod: 24

## 2022-04-25 PROCEDURE — 92136 OPHTHALMIC BIOMETRY: CPT | Mod: 26,RT

## 2022-04-26 ENCOUNTER — APPOINTMENT (OUTPATIENT)
Dept: OPHTHALMOLOGY | Facility: CLINIC | Age: 67
End: 2022-04-26

## 2022-04-28 ENCOUNTER — OUTPATIENT (OUTPATIENT)
Dept: OUTPATIENT SERVICES | Facility: HOSPITAL | Age: 67
LOS: 1 days | End: 2022-04-28
Payer: MEDICARE

## 2022-04-28 VITALS
TEMPERATURE: 98 F | SYSTOLIC BLOOD PRESSURE: 130 MMHG | HEIGHT: 64 IN | DIASTOLIC BLOOD PRESSURE: 86 MMHG | WEIGHT: 197.98 LBS | OXYGEN SATURATION: 99 % | HEART RATE: 98 BPM | RESPIRATION RATE: 16 BRPM

## 2022-04-28 DIAGNOSIS — Z98.49 CATARACT EXTRACTION STATUS, UNSPECIFIED EYE: ICD-10-CM

## 2022-04-28 DIAGNOSIS — K21.9 GASTRO-ESOPHAGEAL REFLUX DISEASE WITHOUT ESOPHAGITIS: ICD-10-CM

## 2022-04-28 DIAGNOSIS — Z98.890 OTHER SPECIFIED POSTPROCEDURAL STATES: Chronic | ICD-10-CM

## 2022-04-28 DIAGNOSIS — E03.9 HYPOTHYROIDISM, UNSPECIFIED: ICD-10-CM

## 2022-04-28 DIAGNOSIS — H26.9 UNSPECIFIED CATARACT: Chronic | ICD-10-CM

## 2022-04-28 DIAGNOSIS — C56.9 MALIGNANT NEOPLASM OF UNSPECIFIED OVARY: ICD-10-CM

## 2022-04-28 DIAGNOSIS — Z90.89 ACQUIRED ABSENCE OF OTHER ORGANS: Chronic | ICD-10-CM

## 2022-04-28 DIAGNOSIS — Z96.651 PRESENCE OF RIGHT ARTIFICIAL KNEE JOINT: Chronic | ICD-10-CM

## 2022-04-28 DIAGNOSIS — Z87.898 PERSONAL HISTORY OF OTHER SPECIFIED CONDITIONS: ICD-10-CM

## 2022-04-28 DIAGNOSIS — Z87.81 PERSONAL HISTORY OF (HEALED) TRAUMATIC FRACTURE: Chronic | ICD-10-CM

## 2022-04-28 DIAGNOSIS — Z98.89 OTHER SPECIFIED POSTPROCEDURAL STATES: Chronic | ICD-10-CM

## 2022-04-28 DIAGNOSIS — Z90.710 ACQUIRED ABSENCE OF BOTH CERVIX AND UTERUS: Chronic | ICD-10-CM

## 2022-04-28 DIAGNOSIS — I10 ESSENTIAL (PRIMARY) HYPERTENSION: ICD-10-CM

## 2022-04-28 LAB
A1C WITH ESTIMATED AVERAGE GLUCOSE RESULT: 4.8 % — SIGNIFICANT CHANGE UP (ref 4–5.6)
ALBUMIN SERPL ELPH-MCNC: 4.9 G/DL — SIGNIFICANT CHANGE UP (ref 3.3–5)
ALP SERPL-CCNC: 94 U/L — SIGNIFICANT CHANGE UP (ref 40–120)
ALT FLD-CCNC: 35 U/L — HIGH (ref 4–33)
ANION GAP SERPL CALC-SCNC: 17 MMOL/L — HIGH (ref 7–14)
AST SERPL-CCNC: 37 U/L — HIGH (ref 4–32)
BILIRUB SERPL-MCNC: 0.2 MG/DL — SIGNIFICANT CHANGE UP (ref 0.2–1.2)
BLD GP AB SCN SERPL QL: NEGATIVE — SIGNIFICANT CHANGE UP
BUN SERPL-MCNC: 16 MG/DL — SIGNIFICANT CHANGE UP (ref 7–23)
CALCIUM SERPL-MCNC: 11.3 MG/DL — HIGH (ref 8.4–10.5)
CHLORIDE SERPL-SCNC: 102 MMOL/L — SIGNIFICANT CHANGE UP (ref 98–107)
CO2 SERPL-SCNC: 20 MMOL/L — LOW (ref 22–31)
CREAT SERPL-MCNC: 0.73 MG/DL — SIGNIFICANT CHANGE UP (ref 0.5–1.3)
EGFR: 91 ML/MIN/1.73M2 — SIGNIFICANT CHANGE UP
ESTIMATED AVERAGE GLUCOSE: 91 — SIGNIFICANT CHANGE UP
GLUCOSE SERPL-MCNC: 89 MG/DL — SIGNIFICANT CHANGE UP (ref 70–99)
HCT VFR BLD CALC: 37.9 % — SIGNIFICANT CHANGE UP (ref 34.5–45)
HGB BLD-MCNC: 12.3 G/DL — SIGNIFICANT CHANGE UP (ref 11.5–15.5)
MCHC RBC-ENTMCNC: 32.5 GM/DL — SIGNIFICANT CHANGE UP (ref 32–36)
MCHC RBC-ENTMCNC: 32.7 PG — SIGNIFICANT CHANGE UP (ref 27–34)
MCV RBC AUTO: 100.8 FL — HIGH (ref 80–100)
NRBC # BLD: 0 /100 WBCS — SIGNIFICANT CHANGE UP
NRBC # FLD: 0 K/UL — SIGNIFICANT CHANGE UP
PLATELET # BLD AUTO: 187 K/UL — SIGNIFICANT CHANGE UP (ref 150–400)
POTASSIUM SERPL-MCNC: 4.3 MMOL/L — SIGNIFICANT CHANGE UP (ref 3.5–5.3)
POTASSIUM SERPL-SCNC: 4.3 MMOL/L — SIGNIFICANT CHANGE UP (ref 3.5–5.3)
PROT SERPL-MCNC: 8 G/DL — SIGNIFICANT CHANGE UP (ref 6–8.3)
RBC # BLD: 3.76 M/UL — LOW (ref 3.8–5.2)
RBC # FLD: 12.5 % — SIGNIFICANT CHANGE UP (ref 10.3–14.5)
RH IG SCN BLD-IMP: POSITIVE — SIGNIFICANT CHANGE UP
SODIUM SERPL-SCNC: 139 MMOL/L — SIGNIFICANT CHANGE UP (ref 135–145)
WBC # BLD: 4.06 K/UL — SIGNIFICANT CHANGE UP (ref 3.8–10.5)
WBC # FLD AUTO: 4.06 K/UL — SIGNIFICANT CHANGE UP (ref 3.8–10.5)

## 2022-04-28 PROCEDURE — 93010 ELECTROCARDIOGRAM REPORT: CPT

## 2022-04-28 RX ORDER — SODIUM CHLORIDE 9 MG/ML
1000 INJECTION, SOLUTION INTRAVENOUS
Refills: 0 | Status: DISCONTINUED | OUTPATIENT
Start: 2022-05-02 | End: 2022-05-02

## 2022-04-28 RX ORDER — AMLODIPINE BESYLATE 2.5 MG/1
1 TABLET ORAL
Qty: 0 | Refills: 0 | DISCHARGE

## 2022-04-28 RX ORDER — LOSARTAN POTASSIUM 100 MG/1
1 TABLET, FILM COATED ORAL
Qty: 0 | Refills: 0 | DISCHARGE

## 2022-04-28 NOTE — H&P PST ADULT - NSICDXPASTSURGICALHX_GEN_ALL_CORE_FT
PAST SURGICAL HISTORY:  Cataract s/p Left Cataract Excision  4/13/22    H/O arthroscopy of knee 2/22    H/O fracture of wrist ORIF Right Wrist 2012    History of incisional hernia repair 2019    s/p (R) ACL repair Right 2000    S/P colonoscopy 2019    s/p surgical correction of pleural effusion 30 years ago and Chest tube x15 days initially     S/P SAM-BSO 8/2016    S/P tonsillectomy 1962    S/P total knee replacement, right 1/14/21    S/P wrist surgery ORIF right wrist 2012

## 2022-04-28 NOTE — H&P PST ADULT - SYMPTOMS
none Pt reports sob 9/21 after returning from trip; pt reports st/echo . Pt denies any further c/o cp, palpitations, sob at present/none Pt denies cp, denies palpitations, denies sob/none

## 2022-04-28 NOTE — H&P PST ADULT - ACTIVITY
Pt normally does piliates 3-5 days week ; on hold secondary to right knee surgery; pt able to climb 1 flight stairs Pt normally does piliates 3-5 days week ; on hold secondary to right knee surgery; pt  with stairs in home ; able to climb ; pt denies cp, denies palpitations, denies sob

## 2022-04-28 NOTE — H&P PST ADULT - NS MD HP INPLANTS MED DEV
Right TKR  and Right wrist hardware 2007 Right TKR  and Right wrist hardware 2007, lens left cataract

## 2022-04-28 NOTE — H&P PST ADULT - NSICDXPASTMEDICALHX_GEN_ALL_CORE_FT
PAST MEDICAL HISTORY:  COVID 12/21    GERD (gastroesophageal reflux disease)     HTN (hypertension)     Hypothyroidism     Mass of ovary     Ovarian cancer S/P SAM-BSO no chemo or radiation  2016    Pleural effusion right lung, 1984    Right knee pain

## 2022-04-28 NOTE — H&P PST ADULT - PROBLEM SELECTOR PLAN 5
s/p Left cataract Excision 4/13/22    Reviewed with Dr Bowling    Pt unclear regarding rx gtts ;not obtained from pharmacy ; delivered to pt ; needs to be clarified pre op   Will request pt bring information dos

## 2022-04-28 NOTE — H&P PST ADULT - PROBLEM SELECTOR PLAN 1
Laparoscopic Resection of Pelvic Mass, Possible Exploratory laparotomy      Pre op instructions including Hibiclens with teach back reviewed with pt ; pt verbalized good understanding of pre op instructions    Request recent cardiac evaluation Dr Padilla; including    Exercise ST 11/11/21 CT Angiography 1/27/22 Echo   reviewed with DR Bowling Laparoscopic Resection of Pelvic Mass, Possible Exploratory laparotomy      Pre op instructions including Hibiclens with teach back reviewed with pt ; pt verbalized good understanding of pre op instructions    Request recent cardiac evaluation Dr Padilla; including    Exercise ST 11/11/21, Echo, Angiogram per pt ,  CT Angiography 1/27/22    reviewed with DR Bowling

## 2022-04-28 NOTE — H&P PST ADULT - HISTORY OF PRESENT ILLNESS
Pt is a 67 y.o. female ; pt reports h/o Ovarian cancer ; s/p SAM-BSO. Pt denies chemo, denies rt . Pt states 8/21 ; pt f/u every 6 months ; pt f/u 1/22 ; pt states " tumor markers were elevated; pt s/p Pet Scan ; pt now presents for Laparoscopic Resection of Pelvic mass , Possible exploratory Laparotomy  Pt is a 67 y.o. female ; pt reports h/o Ovarian cancer ; s/p SAM-BSO Staging . Pt denies chemo, denies rt . Pt states pt f/u every 6 months ; pt f/u 1/22 ; pt states " tumor markers were elevated; pt s/p Pet Scan ; pt now presents for Laparoscopic Resection of Pelvic mass , Possible exploratory Laparotomy

## 2022-04-29 NOTE — ASU PATIENT PROFILE, ADULT - FALL HARM RISK - UNIVERSAL INTERVENTIONS
Bed in lowest position, wheels locked, appropriate side rails in place/Call bell, personal items and telephone in reach/Instruct patient to call for assistance before getting out of bed or chair/Non-slip footwear when patient is out of bed/[place] to call system/Physically safe environment - no spills, clutter or unnecessary equipment/Purposeful Proactive Rounding/Room/bathroom lighting operational, light cord in reach

## 2022-05-01 ENCOUNTER — TRANSCRIPTION ENCOUNTER (OUTPATIENT)
Age: 67
End: 2022-05-01

## 2022-05-02 ENCOUNTER — OUTPATIENT (OUTPATIENT)
Dept: OUTPATIENT SERVICES | Facility: HOSPITAL | Age: 67
LOS: 1 days | Discharge: HOME | End: 2022-05-02
Payer: MEDICARE

## 2022-05-02 ENCOUNTER — RESULT REVIEW (OUTPATIENT)
Age: 67
End: 2022-05-02

## 2022-05-02 ENCOUNTER — TRANSCRIPTION ENCOUNTER (OUTPATIENT)
Age: 67
End: 2022-05-02

## 2022-05-02 ENCOUNTER — APPOINTMENT (OUTPATIENT)
Dept: GYNECOLOGIC ONCOLOGY | Facility: HOSPITAL | Age: 67
End: 2022-05-02

## 2022-05-02 VITALS
HEART RATE: 69 BPM | RESPIRATION RATE: 16 BRPM | DIASTOLIC BLOOD PRESSURE: 70 MMHG | OXYGEN SATURATION: 96 % | SYSTOLIC BLOOD PRESSURE: 105 MMHG

## 2022-05-02 VITALS
WEIGHT: 197.98 LBS | TEMPERATURE: 98 F | HEIGHT: 64 IN | RESPIRATION RATE: 16 BRPM | HEART RATE: 78 BPM | SYSTOLIC BLOOD PRESSURE: 135 MMHG | OXYGEN SATURATION: 99 %

## 2022-05-02 DIAGNOSIS — Z98.890 OTHER SPECIFIED POSTPROCEDURAL STATES: Chronic | ICD-10-CM

## 2022-05-02 DIAGNOSIS — Z87.81 PERSONAL HISTORY OF (HEALED) TRAUMATIC FRACTURE: Chronic | ICD-10-CM

## 2022-05-02 DIAGNOSIS — Z98.89 OTHER SPECIFIED POSTPROCEDURAL STATES: Chronic | ICD-10-CM

## 2022-05-02 DIAGNOSIS — Z90.89 ACQUIRED ABSENCE OF OTHER ORGANS: Chronic | ICD-10-CM

## 2022-05-02 DIAGNOSIS — Z96.651 PRESENCE OF RIGHT ARTIFICIAL KNEE JOINT: Chronic | ICD-10-CM

## 2022-05-02 DIAGNOSIS — C56.9 MALIGNANT NEOPLASM OF UNSPECIFIED OVARY: ICD-10-CM

## 2022-05-02 DIAGNOSIS — H26.9 UNSPECIFIED CATARACT: Chronic | ICD-10-CM

## 2022-05-02 DIAGNOSIS — Z90.710 ACQUIRED ABSENCE OF BOTH CERVIX AND UTERUS: Chronic | ICD-10-CM

## 2022-05-02 PROCEDURE — 58960 EXPLORATION OF ABDOMEN: CPT

## 2022-05-02 PROCEDURE — 88112 CYTOPATH CELL ENHANCE TECH: CPT | Mod: 26

## 2022-05-02 PROCEDURE — 88305 TISSUE EXAM BY PATHOLOGIST: CPT | Mod: 26,59

## 2022-05-02 PROCEDURE — 88342 IMHCHEM/IMCYTCHM 1ST ANTB: CPT | Mod: 26

## 2022-05-02 PROCEDURE — 88305 TISSUE EXAM BY PATHOLOGIST: CPT | Mod: 26

## 2022-05-02 PROCEDURE — 88360 TUMOR IMMUNOHISTOCHEM/MANUAL: CPT | Mod: 26

## 2022-05-02 PROCEDURE — 88331 PATH CONSLTJ SURG 1 BLK 1SPC: CPT | Mod: 26

## 2022-05-02 RX ORDER — METOPROLOL TARTRATE 50 MG
5 TABLET ORAL EVERY 6 HOURS
Refills: 0 | Status: DISCONTINUED | OUTPATIENT
Start: 2022-05-02 | End: 2022-05-16

## 2022-05-02 RX ORDER — IBUPROFEN 200 MG
600 TABLET ORAL EVERY 6 HOURS
Refills: 0 | Status: DISCONTINUED | OUTPATIENT
Start: 2022-05-02 | End: 2022-05-16

## 2022-05-02 RX ORDER — ACETAMINOPHEN 500 MG
3 TABLET ORAL
Qty: 0 | Refills: 0 | DISCHARGE
Start: 2022-05-02

## 2022-05-02 RX ORDER — FENTANYL CITRATE 50 UG/ML
50 INJECTION INTRAVENOUS
Refills: 0 | Status: DISCONTINUED | OUTPATIENT
Start: 2022-05-02 | End: 2022-05-02

## 2022-05-02 RX ORDER — ACETAMINOPHEN 500 MG
975 TABLET ORAL EVERY 6 HOURS
Refills: 0 | Status: DISCONTINUED | OUTPATIENT
Start: 2022-05-02 | End: 2022-05-16

## 2022-05-02 RX ORDER — ONDANSETRON 8 MG/1
4 TABLET, FILM COATED ORAL
Refills: 0 | Status: DISCONTINUED | OUTPATIENT
Start: 2022-05-02 | End: 2022-05-16

## 2022-05-02 RX ORDER — OXYCODONE HYDROCHLORIDE 5 MG/1
5 TABLET ORAL EVERY 6 HOURS
Refills: 0 | Status: DISCONTINUED | OUTPATIENT
Start: 2022-05-02 | End: 2022-05-02

## 2022-05-02 RX ORDER — LEVOTHYROXINE SODIUM 125 MCG
137 TABLET ORAL DAILY
Refills: 0 | Status: DISCONTINUED | OUTPATIENT
Start: 2022-05-02 | End: 2022-05-16

## 2022-05-02 RX ORDER — SODIUM CHLORIDE 9 MG/ML
1000 INJECTION, SOLUTION INTRAVENOUS
Refills: 0 | Status: DISCONTINUED | OUTPATIENT
Start: 2022-05-02 | End: 2022-05-16

## 2022-05-02 RX ORDER — IBUPROFEN 200 MG
1 TABLET ORAL
Qty: 0 | Refills: 0 | DISCHARGE
Start: 2022-05-02

## 2022-05-02 RX ORDER — OXYCODONE HYDROCHLORIDE 5 MG/1
1 TABLET ORAL
Qty: 10 | Refills: 0
Start: 2022-05-02

## 2022-05-02 RX ADMIN — Medication 600 MILLIGRAM(S): at 18:24

## 2022-05-02 RX ADMIN — SODIUM CHLORIDE 125 MILLILITER(S): 9 INJECTION, SOLUTION INTRAVENOUS at 17:23

## 2022-05-02 RX ADMIN — FENTANYL CITRATE 50 MICROGRAM(S): 50 INJECTION INTRAVENOUS at 17:38

## 2022-05-02 RX ADMIN — FENTANYL CITRATE 50 MICROGRAM(S): 50 INJECTION INTRAVENOUS at 17:23

## 2022-05-02 RX ADMIN — Medication 600 MILLIGRAM(S): at 18:49

## 2022-05-02 NOTE — ASU DISCHARGE PLAN (ADULT/PEDIATRIC) - CARE PROVIDER_API CALL
Debra Montero)  Gynecologic Oncology; Obstetrics and Gynecology  [address] [place] [address]  Phone: [phone]  Fax: [phone]  Follow Up Time:

## 2022-05-02 NOTE — BRIEF OPERATIVE NOTE - NSICDXBRIEFPROCEDURE_GEN_ALL_CORE_FT
PROCEDURES:  Exploratory laparotomy 02-May-2022 16:31:54 excision of peritoneal nodule Khanh Weinstein  Diagnostic laparoscopy 02-May-2022 16:32:15  Khanh Weinstein

## 2022-05-02 NOTE — ASU DISCHARGE PLAN (ADULT/PEDIATRIC) - NURSING INSTRUCTIONS
DO NOT take any Tylenol (Acetaminophen) or narcotics containing Tylenol until after  9:10 pm_ . You received Tylenol during your operation and it can cause damage to your liver if too much is taken within a 24 hour time period. DO NOT take any Ibuprofen ,Advil or Aleeve until after 12:30 am . You received Toradol during your operation and it can cause damage if too much is taken within a 24 hour time period.

## 2022-05-02 NOTE — ASU DISCHARGE PLAN (ADULT/PEDIATRIC) - NS MD DC FALL RISK RISK
For information on Fall & Injury Prevention, visit: [email or web address] OR  [email or web address] OR  https://www.cdc.gov/steadi/patient.html

## 2022-05-02 NOTE — ASU DISCHARGE PLAN (ADULT/PEDIATRIC) - PAIN MANAGEMENT
You can take up to 600mg Ibuprofen every 6 hours and/or tylenol 975mg every 6 hours/Take over the counter pain medication/Prescriptions electronically submitted to pharmacy from [facility]

## 2022-05-02 NOTE — ASU DISCHARGE PLAN (ADULT/PEDIATRIC) - ASU DC SPECIAL INSTRUCTIONSFT
Return to your regular way of eating.  Resume normal activity as tolerated, but no heavy lifting or strenuous activity for 2 weeks.  No driving for next 2 weeks and/or while on narcotic pain medication.  Complete vaginal rest, no tampons, no douching, no tub bathing, no sexual activities for 6 weeks unless otherwise instructed by your doctor.  Call your doctor with any signs and symptoms of infection such as fever, chills, nausea or vomiting.  Call your doctor with redness or swelling at the incision site, fluid leakage or wound separation.  Call your doctor if you're unable to tolerate food or have difficulty urinating.  Call your doctor if you have pain that is not relieved by your prescribed medications.  Notify your doctor with any other concerns.  Follow up with Dr. Montero on May 17 at 1 pm.

## 2022-05-02 NOTE — ASU DISCHARGE PLAN (ADULT/PEDIATRIC) - ACTIVITY LEVEL
Nothing per vagina/No tub baths/No douching/No tampons/No intercourse No excercise/No heavy lifting/No sports/gym/Nothing per vagina/No tub baths/No douching/No tampons/No intercourse

## 2022-05-02 NOTE — BRIEF OPERATIVE NOTE - OPERATION/FINDINGS
Nodule ~2 fingerbreadths superior and lateral to the R from the pubic symphysis palpated.   Intra-abdominal: ~1x1cm nodule noted just beneath rectus muscle, firm. On laparoscopic evaluation, noted bowel adhered to mesh at area of the just infraumbilical. Grossly normal omentum, liver. No obvious nodules or masses noted. Nodule ~2 fingerbreadths superior and lateral to the R from the pubic symphysis palpated.   Intra-abdominal: ~1x1cm nodule noted just beneath rectus muscle, firm. On laparoscopic evaluation, noted bowel adhered to mesh at area of the just infraumbilical. Grossly normal upper abdomen. No obvious nodules or masses noted.

## 2022-05-02 NOTE — ASU PREOP CHECKLIST - TYPE OF SOLUTION
Progress Notes by Femi Madsen MD at 10/26/17 02:52 PM     Author:  Femi Madsen MD Service:  (none) Author Type:  Physician     Filed:  10/26/17 03:16 PM Encounter Date:  10/26/2017 Status:  Signed     :  Femi Madsen MD (Physician)              CHIEF COMPLAINT/PATIENT IDENTIFYING INFORMATION:        Jonna is a 89 year old female with a history of[KJ1.1T] diarrhea[KJ1.1M] who is here for follow up in the clinic.     INTERVAL HISTORY:   She was last seen in the clinic[KJ1.1T] a couple years ago.[KJ1.1M]    She has a relatively remote history of michelle in 1990s. She was hospitalized in 2011 with pancreatitis of ?etiology. At that time, her LFT's were elevated. Imaging had been ?showing an ampullary lesion. She underwent EGD/EUS with Dr Oviedo which was unrevealing. I met her after she had been hospitalized in 2012 with abdominal pain and mildly elevated LFT's, not pancreatitis.      Have been following her since that time. Workup generally has been negative. Alk phos has been off and on chronically elevated of uncertain significance with an essentially negative workup (including neg for PBC). She did have imaging at one point showing a moderately dilated bile duct, normal pancreas. My impression was that it was possible that she had SOD as an issue that caused her pancreatitis, but that it was not a clear type 1 picture and therefore would not really recommend doing an ERCP given her age and higher risk for pancreatitis. Should she develop recurrent pancreatitis, might need to consider manometry. Fortunately has been ok since that time.      She had a follow up MRCP in June 2013 that was stable and showed CBD of 14 mm, radiologist felt this was just post michelle changes and did not recommend further monitoring if clinically LFT's were ok, etc. Her alk phos has been mildly elevated over time of uncertain etiology.[KJ1.1C]       Has been on pancreatic enzymes chronically started by JDS, and doesn't want to  stop.[KJ1.1M]  A CT chest/abd/pelvis in 2/15 by Dr Liu was unrevealing[KJ1.1C] (done for weight loss).[KJ1.1M]     Had a colonoscopy in 2009 with single adenoma. EGD in 2012 while in hospital showed inflammatory changes. Bio[KJ1.1C]p[KJ1.1M]sies were neg for dysplasia and HPylori,[KJ1.1C] as were serologies.      At last follow up was doing ok.  She ended up hospitalized end of last year with abd pain.  There was ?choledocholithiasis.  She went to [facility] and had ERCP with sphincterotomy, some sludge was removed.  She has been having diarrhea so made appt with me.[KJ1.1M]      Has been having some problems with abd cramping and loose stools for a few months.  Not really getting worse in general.  Takes OTC diarrhea medications which help.        Was in the ER end of September because of abdominal pain.  Pains were in the lower abdomen.  Kind of similar to what has beesol having.  Feels like the pains are separate from her diarrhea.  Symptoms are not every day.  No constipation.  Feels like sour foods can make her feel worse.  When she has the diarrhea will move her bowels pretty frequently.  No new medications in general.[KJ1.2M]         Allergies:  Advicor and Dilantin [phenytoin]  Current Outpatient Prescriptions     Medication  Sig   • Melatonin 10 MG CAPS Take  by mouth.   • hydrOXYzine (ATARAX) 25 MG tablet TAKE 1 TABLET BY MOUTH EVERY NIGHT   • CREON 6000 UNITS CPEP TAKE 1 CAPSULE BY MOUTH THREE TIMES DAILY WITH MEALS   • sertraline (ZOLOFT) 100 MG tablet Take 1 Tab by mouth daily.   • trazodone (DESYREL) 50 MG tablet TAKE 1 TABLET BY MOUTH EVERY NIGHT   • amlodipine (NORVASC) 5 MG tablet TAKE 1 TABLET BY MOUTH DAILY   • omeprazole (PRILOSEC) 40 MG Cap Take 1 Cap by mouth daily.   • TYLENOL OR tylenol pm prn     Patient Active Problem List    Diagnosis    • Hyperlipidemia   • Essential hypertension, benign   • CONVULSIONS, OTHER (SEIZURE)   • DYSTHYMIC DISORDER (DEPRESSION)   • Esophageal reflux   • Personal  history of colonic polyps   • H. pylori infection   • Chronic pain syndrome   • Lumbar spinal stenosis   • Discogenic syndrome, lumbar   • SI (sacroiliac) joint dysfunction   • Protruded lumbar disc   • Impingement syndrome, shoulder   • Closed nondisplaced fracture of middle third of scaphoid bone of right wrist   • Major neurocognitive disorder       Social History:  Smoking:[KJ1.1T] denies smoking[KJ1.3M]    Alcohol:[KJ1.1T] denies alcohol consumption[KJ1.3M]     Family History:  As noted in prior clinic visit.             ROS:   Constitutional:  No fever, chills, change in weight.   Chest: No cough or SOB  CV: No chest pain  GI: Abdominal pain:[KJ1.1T] YES, as noted in HPI.[KJ1.3M]        Bowel problems:[KJ1.1T] YES, as noted in HPI.[KJ1.3M]   - No bladder complaints  Derm: No rash  All other systems reviewed and are negative.     OBJECTIVE:       /56  Pulse 72  Resp 16  Ht 5' 0.5\" (1.537 m)  Wt 114 lb (51.7 kg)  BMI 21.9 kg/m2    Physical Exam:  General appearance - NAD,  pleasant and comfortable  Skin - no rash, no jaundice  HEENT - Sclera -anicteric, Oropharynx clear,   Abdomen - Soft, non-tender, non-distended. BS normal. No masses, organomegaly  Musculoskeletal - No joint effusions  Neuro - normal, normal gait, no tremors, grossly intact  Psych - appropriate affect, oriented    Previous Reports:   GI Procedures:[KJ1.1T] as above[KJ1.3M]    Radiology and labs were reviewed by me independently and discussed with the patient.[KJ1.3T]     Radiology:[KJ1.1T]   CT 10/6/17:[KJ1.2M]  Impression:  1. No acute intra-abdominal or intrapelvic process.  2. Unchanged mild intra and extrahepatic dilatation, likely secondary to cholecystectomy.  3. Moderate fecal loading. Colonic diverticulosis.[KJ1.2C]  Hematology:   Lab Results      Component  Value Date    WBC 6.6 09/15/2017    HGB 13.1 09/15/2017    HCT 38.3 09/15/2017     09/15/2017    ANC 4.3 09/15/2017    ALC 1.5 09/15/2017    MCV 91.4 09/15/2017      Chemistry:   Lab Results      Component  Value Date     09/15/2017    K 4.3 09/15/2017    K 4.8 06/21/2001     09/15/2017    CO2 29 09/15/2017    BUN 12 09/15/2017    CREAT 0.7 09/15/2017    CA 9.6 09/15/2017    GLUCOSE 86 09/15/2017    GLUCOSE 93 08/09/2017     LFT's  Lab Results      Component  Value Date    AST 25 09/15/2017    ALT 24 09/15/2017    ALKPHOS 130 09/15/2017    BILI 0.3 09/15/2017    BILIDIR 0.1 09/08/2014    PROT 7.6 09/15/2017    ALB 4.5 09/15/2017     Coags:  No results found for: PT, PTT, INR   Other:   Lab Results      Component  Value Date    FERRITIN 11 06/03/2015    IRON 97 06/03/2015    TIBC 421 06/03/2015    FE 23 06/03/2015    AMYLASE 76 03/28/2013    LIPASE 95 03/28/2013    SEDRATE 32 07/02/2008        ASSESSMENT:  89 year old female with a history of[KJ1.1T] abdominal pain and loose stools here for follow-up in clinic.  Since I last saw her she has developed the symptoms.  The only change since I last saw her was that she had an ERCP last year with a sphincterotomy due to questionable choledocholithiasis.  No stone was seen but she did have some sludge that was removed.  Her symptoms are not every day but are relatively frequent.  They seem to be worsened with certain foods such as heavier meals.  Per her daughter she is eating a lot of Italian beef and thinks that may be contributing.  I do wonder about bile salt diarrhea given her sphincterotomy and she may be spilling excessive bile into her system which could lead to diarrhea and pain.  We discussed the role of cholestyramine to see if this would help her.  I think this is certainly worth try.  I think antidiarrheals that are over-the-counter could be considered as well.    From my standpoint my threshold to do any endoscopic evaluation to be higher given her advanced age.  Her LFTs are stable.  She was in the emergency room a couple weeks ago and had a stable/unremarkable CAT scan as well.    After discussion with  the patient and her daughter they did think this seems like a reasonable approach.[KJ1.3M]    PLAN:[KJ1.1T]  1) we will try cholestyramine twice daily.  Her daughter thinks she may only be able to do it once a day due to her mild dementia.  I think this is fine to try it just once a day and see how it goes  2) okay to use over-the-counter Imodium  3) if still having issues could consider dicyclomine as well.  4) I did recommend to try and avoid fatty or meals.  5) if doing okay can follow-up as needed[KJ1.3M]    Electronically Signed by:    Femi Madsen MD , 10/26/2017[KJ1.1T]           Revision History        User Key Date/Time User Provider Type Action    > KJ1.3 10/26/17 03:16 PM Femi Madsen MD Physician Sign     KJ1.2 10/26/17 02:57 PM Femi Madsen MD Physician      KJ1.1 10/26/17 02:52 PM Femi Madsen MD Physician     C - Copied, M - Manual, T - Template             LR

## 2022-05-03 ENCOUNTER — NON-APPOINTMENT (OUTPATIENT)
Age: 67
End: 2022-05-03

## 2022-05-05 ENCOUNTER — NON-APPOINTMENT (OUTPATIENT)
Age: 67
End: 2022-05-05

## 2022-05-05 LAB
GLUCOSE BLDC GLUCOMTR-MCNC: 103 MG/DL — HIGH (ref 70–99)
NON-GYNECOLOGICAL CYTOLOGY STUDY: SIGNIFICANT CHANGE UP

## 2022-05-11 ENCOUNTER — NON-APPOINTMENT (OUTPATIENT)
Age: 67
End: 2022-05-11

## 2022-05-11 ENCOUNTER — APPOINTMENT (OUTPATIENT)
Dept: OPHTHALMOLOGY | Facility: HOSPITAL | Age: 67
End: 2022-05-11
Payer: MEDICARE

## 2022-05-11 ENCOUNTER — OUTPATIENT (OUTPATIENT)
Dept: OUTPATIENT SERVICES | Facility: HOSPITAL | Age: 67
LOS: 1 days | End: 2022-05-11
Payer: MEDICARE

## 2022-05-11 DIAGNOSIS — Z90.710 ACQUIRED ABSENCE OF BOTH CERVIX AND UTERUS: Chronic | ICD-10-CM

## 2022-05-11 DIAGNOSIS — Z98.890 OTHER SPECIFIED POSTPROCEDURAL STATES: Chronic | ICD-10-CM

## 2022-05-11 DIAGNOSIS — Z87.81 PERSONAL HISTORY OF (HEALED) TRAUMATIC FRACTURE: Chronic | ICD-10-CM

## 2022-05-11 DIAGNOSIS — Z98.89 OTHER SPECIFIED POSTPROCEDURAL STATES: Chronic | ICD-10-CM

## 2022-05-11 DIAGNOSIS — Z90.89 ACQUIRED ABSENCE OF OTHER ORGANS: Chronic | ICD-10-CM

## 2022-05-11 DIAGNOSIS — Z96.651 PRESENCE OF RIGHT ARTIFICIAL KNEE JOINT: Chronic | ICD-10-CM

## 2022-05-11 DIAGNOSIS — H26.9 UNSPECIFIED CATARACT: Chronic | ICD-10-CM

## 2022-05-11 LAB — SURGICAL PATHOLOGY STUDY: SIGNIFICANT CHANGE UP

## 2022-05-11 PROCEDURE — 66984 XCAPSL CTRC RMVL W/O ECP: CPT | Mod: 79,RT

## 2022-05-12 ENCOUNTER — APPOINTMENT (OUTPATIENT)
Dept: OPHTHALMOLOGY | Facility: CLINIC | Age: 67
End: 2022-05-12
Payer: MEDICARE

## 2022-05-12 ENCOUNTER — NON-APPOINTMENT (OUTPATIENT)
Age: 67
End: 2022-05-12

## 2022-05-12 PROCEDURE — 99024 POSTOP FOLLOW-UP VISIT: CPT

## 2022-05-15 DIAGNOSIS — K21.9 GASTRO-ESOPHAGEAL REFLUX DISEASE WITHOUT ESOPHAGITIS: ICD-10-CM

## 2022-05-15 DIAGNOSIS — E03.9 HYPOTHYROIDISM, UNSPECIFIED: ICD-10-CM

## 2022-05-15 DIAGNOSIS — I10 ESSENTIAL (PRIMARY) HYPERTENSION: ICD-10-CM

## 2022-05-15 DIAGNOSIS — H25.11 AGE-RELATED NUCLEAR CATARACT, RIGHT EYE: ICD-10-CM

## 2022-05-17 ENCOUNTER — APPOINTMENT (OUTPATIENT)
Dept: GYNECOLOGIC ONCOLOGY | Facility: CLINIC | Age: 67
End: 2022-05-17

## 2022-05-17 VITALS
BODY MASS INDEX: 29.73 KG/M2 | HEIGHT: 66 IN | HEART RATE: 103 BPM | SYSTOLIC BLOOD PRESSURE: 134 MMHG | WEIGHT: 185 LBS | DIASTOLIC BLOOD PRESSURE: 92 MMHG

## 2022-05-17 DIAGNOSIS — R19.03 RIGHT LOWER QUADRANT ABDOMINAL SWELLING, MASS AND LUMP: ICD-10-CM

## 2022-05-17 PROBLEM — U07.1 COVID-19: Chronic | Status: ACTIVE | Noted: 2022-04-28

## 2022-05-17 PROBLEM — H26.9 UNSPECIFIED CATARACT: Chronic | Status: ACTIVE | Noted: 2022-04-28

## 2022-05-17 PROCEDURE — 99024 POSTOP FOLLOW-UP VISIT: CPT

## 2022-05-19 ENCOUNTER — NON-APPOINTMENT (OUTPATIENT)
Age: 67
End: 2022-05-19

## 2022-05-20 PROBLEM — R19.03 RIGHT LOWER QUADRANT ABDOMINAL MASS: Status: ACTIVE | Noted: 2022-04-05

## 2022-05-23 ENCOUNTER — NON-APPOINTMENT (OUTPATIENT)
Age: 67
End: 2022-05-23

## 2022-05-23 ENCOUNTER — APPOINTMENT (OUTPATIENT)
Dept: OPHTHALMOLOGY | Facility: CLINIC | Age: 67
End: 2022-05-23
Payer: MEDICARE

## 2022-05-23 PROCEDURE — 99024 POSTOP FOLLOW-UP VISIT: CPT

## 2022-06-27 ENCOUNTER — APPOINTMENT (OUTPATIENT)
Dept: OPHTHALMOLOGY | Facility: CLINIC | Age: 67
End: 2022-06-27
Payer: MEDICARE

## 2022-06-27 ENCOUNTER — NON-APPOINTMENT (OUTPATIENT)
Age: 67
End: 2022-06-27

## 2022-06-27 PROCEDURE — 99024 POSTOP FOLLOW-UP VISIT: CPT

## 2022-07-26 ENCOUNTER — APPOINTMENT (OUTPATIENT)
Dept: GYNECOLOGIC ONCOLOGY | Facility: CLINIC | Age: 67
End: 2022-07-26

## 2022-08-24 ENCOUNTER — APPOINTMENT (OUTPATIENT)
Dept: GYNECOLOGIC ONCOLOGY | Facility: CLINIC | Age: 67
End: 2022-08-24

## 2022-08-24 VITALS
HEIGHT: 66 IN | HEART RATE: 87 BPM | WEIGHT: 200 LBS | BODY MASS INDEX: 32.14 KG/M2 | DIASTOLIC BLOOD PRESSURE: 75 MMHG | SYSTOLIC BLOOD PRESSURE: 109 MMHG

## 2022-08-24 PROCEDURE — 99213 OFFICE O/P EST LOW 20 MIN: CPT

## 2022-08-25 LAB — CANCER AG125 SERPL-ACNC: 44 U/ML

## 2022-08-26 ENCOUNTER — NON-APPOINTMENT (OUTPATIENT)
Age: 67
End: 2022-08-26

## 2022-09-01 ENCOUNTER — APPOINTMENT (OUTPATIENT)
Dept: CT IMAGING | Facility: CLINIC | Age: 67
End: 2022-09-01

## 2022-09-01 ENCOUNTER — RESULT REVIEW (OUTPATIENT)
Age: 67
End: 2022-09-01

## 2022-09-01 PROCEDURE — 74177 CT ABD & PELVIS W/CONTRAST: CPT

## 2022-09-09 ENCOUNTER — NON-APPOINTMENT (OUTPATIENT)
Age: 67
End: 2022-09-09

## 2022-10-06 ENCOUNTER — NON-APPOINTMENT (OUTPATIENT)
Age: 67
End: 2022-10-06

## 2022-11-09 ENCOUNTER — NON-APPOINTMENT (OUTPATIENT)
Age: 67
End: 2022-11-09

## 2022-11-22 ENCOUNTER — APPOINTMENT (OUTPATIENT)
Dept: GYNECOLOGIC ONCOLOGY | Facility: CLINIC | Age: 67
End: 2022-11-22

## 2022-11-22 VITALS
DIASTOLIC BLOOD PRESSURE: 98 MMHG | HEART RATE: 99 BPM | SYSTOLIC BLOOD PRESSURE: 162 MMHG | HEIGHT: 66 IN | WEIGHT: 203 LBS | BODY MASS INDEX: 32.62 KG/M2

## 2022-11-22 PROCEDURE — 99213 OFFICE O/P EST LOW 20 MIN: CPT

## 2022-11-25 ENCOUNTER — NON-APPOINTMENT (OUTPATIENT)
Age: 67
End: 2022-11-25

## 2022-11-29 LAB — CANCER AG125 SERPL-ACNC: 50 U/ML

## 2022-11-29 NOTE — ASU PATIENT PROFILE, ADULT - ABLE TO REACH PT
yes Product 50 Price (In Dollars - Numeric Only, No Special Characters Or $): 0.00 Name Of Product 28: Hydrating Cream Product 16 Units: 0 Product 20 Application Directions: Apply to entire face QHS Detail Level: Zone Product 5 Application Directions: Apply QAM Name Of Product 23: Gentle cleanser travel size Name Of Product 34: Illuminating AOX Serum Name Of Product 8: Exfoliating accelerator Product 18 Units: 1 Product 3 Application Directions: Apply to entire face BID after Daily Power Defense Product 16 Application Directions: Apply to entire face QHS as tolerated Name Of Product 21: Oil Control Pads Product 25 Application Directions: Apply to periorbital region QHS Name Of Product 6: Daily Power Defense Product 31 Application Directions: Apply [facility] Assigning Risk Information: Per AMA, level of risk is based upon consequences of the problem(s) addressed at the encounter when appropriately treated. Risk also includes medical decision making related to the need to initiate or forego further testing, treatment and/or hospitalization. Over the counter medication are assigned a risk level of low. Prescription medication management is assigned a risk level of moderate. Product 14 Application Directions: Scrub Every other day after cleansing Name Of Product 32: Growth Factor Serum Product 10 Application Directions: Apply to entire face BID as directed Product 1 Application Directions: Wash BID Name Of Product 17: Complexion renewal pads Product 18 Application Directions: Apply to entire face QAM Product 34 Application Directions: Apply to entire face over DPD and right before SPF Product 28 Application Directions: Apply to entire face QOS PRN alternating with Retin A Name Of Product 19: Tretinoin 0.05% (20g) Risk Of Complication Category: No MDM Name Of Product 26: Dengve Name Of Product 15: Growth Factor Eye Serum Name Of Product 4: Pigment control HQ4% RX Product 23 Application Directions: Cleanse face BID Allow Plan To Count Towards E/M Coding: No (this will remove associated impression from E/M calculation) Name Of Product 11: Firming Serum Product 29 Application Directions: Apply to entire face QAM and reapply as needed. Product 21 Application Directions: Swab BID after cleansing Product 6 Application Directions: Apply to entire face twice daily after pads. Name Of Product 13: Radical night repair Name Of Product 29: ZO Broad SPF 50 Name Of Product 35: Tretinoin 0.1% Name Of Product 2: Exfoliating polish Product 35 Application Directions: Apply to entire face QHS PRN Name Of Product 24: Calming toner Name Of Product 9: Pigment HQ4% blending RX Product 26 Application Directions: Apply to entire face BID after pads Name Of Product 30: Renewal cream Product 32 Application Directions: Apply to entire face QOS or alternating with Retinol Name Of Product 36: Enzymatic peel Send Charges To Patient Encounter: No Product 4 Application Directions: Apply to entire face for pigmentation QAM Name Of Product 22: Intense Eye Repair Name Of Product 27: SPF Brush 40 medium Product 11 Application Directions: Apply BID Product 19 Application Directions: Apply 2 x 3 nights a week as tolerated Name Of Product 33: Complexion clearing mask Product 15 Application Directions: Apply to periorbital region every night Name Of Product 5: Vitamin C serum Name Of Product 20: Retinol Brightener Product 2 Application Directions: Scrub 5 x a week after cleansing Product 17 Application Directions: Apply to entire face BID after cleansing Name Of Product 7: Instant Pore refiner Product 30 Application Directions: Apply to entire face at night alternating with Tretinoin 0.05% Name Of Product 16: Wrinkle Texture Repair Name Of Product 3: Rozatrol Product 36 Application Directions: Apply to entire face QAM on top of the Pigment Control. Name Of Product 12: Exfoliating polish travel size Product 22 Application Directions: Apply to eyelids and crows feet BID Name Of Product 18: ZO SPF Primer Name Of Product 14: Dual Action Scrub Name Of Product 25: Hydrafirm / Brightening Eye Cream Name Of Product 31: Wrinkle and Texture Repair Product 33 Application Directions: 1-2 x week to T-zone Name Of Product 1: Gentle cleanser Name Of Product 10: Complexion Renewal Kit Product 7 Application Directions: Apply to entire face BID

## 2022-12-06 ENCOUNTER — APPOINTMENT (OUTPATIENT)
Dept: NUCLEAR MEDICINE | Facility: CLINIC | Age: 67
End: 2022-12-06

## 2022-12-06 ENCOUNTER — TRANSCRIPTION ENCOUNTER (OUTPATIENT)
Age: 67
End: 2022-12-06

## 2022-12-06 PROCEDURE — A9552: CPT

## 2022-12-06 PROCEDURE — 78815 PET IMAGE W/CT SKULL-THIGH: CPT | Mod: PI

## 2023-01-13 ENCOUNTER — APPOINTMENT (OUTPATIENT)
Dept: OPHTHALMOLOGY | Facility: CLINIC | Age: 68
End: 2023-01-13

## 2023-01-17 ENCOUNTER — RX RENEWAL (OUTPATIENT)
Age: 68
End: 2023-01-17

## 2023-02-28 ENCOUNTER — APPOINTMENT (OUTPATIENT)
Dept: GYNECOLOGIC ONCOLOGY | Facility: CLINIC | Age: 68
End: 2023-02-28
Payer: MEDICARE

## 2023-02-28 VITALS
SYSTOLIC BLOOD PRESSURE: 144 MMHG | WEIGHT: 200 LBS | HEART RATE: 77 BPM | BODY MASS INDEX: 32.14 KG/M2 | HEIGHT: 66 IN | DIASTOLIC BLOOD PRESSURE: 91 MMHG

## 2023-02-28 PROCEDURE — 99213 OFFICE O/P EST LOW 20 MIN: CPT

## 2023-03-02 ENCOUNTER — APPOINTMENT (OUTPATIENT)
Dept: MAMMOGRAPHY | Facility: CLINIC | Age: 68
End: 2023-03-02
Payer: MEDICARE

## 2023-03-02 ENCOUNTER — RESULT REVIEW (OUTPATIENT)
Age: 68
End: 2023-03-02

## 2023-03-02 PROCEDURE — 77067 SCR MAMMO BI INCL CAD: CPT

## 2023-03-02 PROCEDURE — 77063 BREAST TOMOSYNTHESIS BI: CPT

## 2023-05-01 ENCOUNTER — APPOINTMENT (OUTPATIENT)
Dept: OPHTHALMOLOGY | Facility: CLINIC | Age: 68
End: 2023-05-01
Payer: MEDICARE

## 2023-05-01 ENCOUNTER — NON-APPOINTMENT (OUTPATIENT)
Age: 68
End: 2023-05-01

## 2023-05-01 PROCEDURE — 92014 COMPRE OPH EXAM EST PT 1/>: CPT

## 2023-05-30 ENCOUNTER — APPOINTMENT (OUTPATIENT)
Dept: GYNECOLOGIC ONCOLOGY | Facility: CLINIC | Age: 68
End: 2023-05-30
Payer: MEDICARE

## 2023-05-30 VITALS
SYSTOLIC BLOOD PRESSURE: 140 MMHG | HEART RATE: 78 BPM | HEIGHT: 66 IN | DIASTOLIC BLOOD PRESSURE: 95 MMHG | BODY MASS INDEX: 32.14 KG/M2 | WEIGHT: 200 LBS

## 2023-05-30 PROCEDURE — 99213 OFFICE O/P EST LOW 20 MIN: CPT

## 2023-08-15 ENCOUNTER — NON-APPOINTMENT (OUTPATIENT)
Age: 68
End: 2023-08-15

## 2023-08-29 ENCOUNTER — APPOINTMENT (OUTPATIENT)
Dept: GYNECOLOGIC ONCOLOGY | Facility: CLINIC | Age: 68
End: 2023-08-29
Payer: MEDICARE

## 2023-08-29 VITALS
WEIGHT: 191 LBS | HEIGHT: 66 IN | SYSTOLIC BLOOD PRESSURE: 159 MMHG | DIASTOLIC BLOOD PRESSURE: 119 MMHG | BODY MASS INDEX: 30.7 KG/M2 | HEART RATE: 114 BPM

## 2023-08-29 PROCEDURE — 99213 OFFICE O/P EST LOW 20 MIN: CPT

## 2023-08-30 LAB — CANCER AG125 SERPL-ACNC: 54 U/ML

## 2023-09-06 ENCOUNTER — APPOINTMENT (OUTPATIENT)
Dept: NUCLEAR MEDICINE | Facility: CLINIC | Age: 68
End: 2023-09-06
Payer: MEDICARE

## 2023-09-06 PROCEDURE — 78815 PET IMAGE W/CT SKULL-THIGH: CPT | Mod: PS

## 2023-09-06 PROCEDURE — A9552: CPT

## 2023-10-07 ENCOUNTER — OUTPATIENT (OUTPATIENT)
Dept: OUTPATIENT SERVICES | Facility: HOSPITAL | Age: 68
LOS: 1 days | End: 2023-10-07
Payer: MEDICARE

## 2023-10-07 ENCOUNTER — APPOINTMENT (OUTPATIENT)
Dept: NUCLEAR MEDICINE | Facility: IMAGING CENTER | Age: 68
End: 2023-10-07
Payer: MEDICARE

## 2023-10-07 DIAGNOSIS — Z90.89 ACQUIRED ABSENCE OF OTHER ORGANS: Chronic | ICD-10-CM

## 2023-10-07 DIAGNOSIS — H26.9 UNSPECIFIED CATARACT: Chronic | ICD-10-CM

## 2023-10-07 DIAGNOSIS — Z98.89 OTHER SPECIFIED POSTPROCEDURAL STATES: Chronic | ICD-10-CM

## 2023-10-07 DIAGNOSIS — Z98.890 OTHER SPECIFIED POSTPROCEDURAL STATES: Chronic | ICD-10-CM

## 2023-10-07 DIAGNOSIS — Z87.81 PERSONAL HISTORY OF (HEALED) TRAUMATIC FRACTURE: Chronic | ICD-10-CM

## 2023-10-07 DIAGNOSIS — Z90.710 ACQUIRED ABSENCE OF BOTH CERVIX AND UTERUS: Chronic | ICD-10-CM

## 2023-10-07 DIAGNOSIS — Z96.651 PRESENCE OF RIGHT ARTIFICIAL KNEE JOINT: Chronic | ICD-10-CM

## 2023-10-07 DIAGNOSIS — E21.3 HYPERPARATHYROIDISM, UNSPECIFIED: ICD-10-CM

## 2023-10-07 PROCEDURE — 78072 PARATHYRD PLANAR W/SPECT&CT: CPT | Mod: 26

## 2023-10-07 PROCEDURE — A9512: CPT

## 2023-10-07 PROCEDURE — A9500: CPT

## 2023-10-07 PROCEDURE — 78072 PARATHYRD PLANAR W/SPECT&CT: CPT

## 2023-11-28 ENCOUNTER — APPOINTMENT (OUTPATIENT)
Dept: GYNECOLOGIC ONCOLOGY | Facility: CLINIC | Age: 68
End: 2023-11-28
Payer: MEDICARE

## 2023-11-28 VITALS
BODY MASS INDEX: 31.18 KG/M2 | DIASTOLIC BLOOD PRESSURE: 132 MMHG | HEART RATE: 88 BPM | SYSTOLIC BLOOD PRESSURE: 173 MMHG | WEIGHT: 194 LBS | HEIGHT: 66 IN

## 2023-11-28 VITALS
BODY MASS INDEX: 31.31 KG/M2 | HEART RATE: 63 BPM | WEIGHT: 194 LBS | DIASTOLIC BLOOD PRESSURE: 76 MMHG | SYSTOLIC BLOOD PRESSURE: 121 MMHG

## 2023-11-28 PROCEDURE — 99213 OFFICE O/P EST LOW 20 MIN: CPT

## 2023-11-28 RX ORDER — HALOBETASOL PROPIONATE 0.5 MG/G
0.05 CREAM TOPICAL DAILY
Qty: 30 | Refills: 0 | Status: DISCONTINUED | COMMUNITY
Start: 2018-02-09 | End: 2023-11-28

## 2023-11-28 RX ORDER — AMLODIPINE BESYLATE 5 MG/1
TABLET ORAL
Refills: 0 | Status: DISCONTINUED | COMMUNITY
End: 2023-11-28

## 2023-11-29 LAB — CANCER AG125 SERPL-ACNC: 52 U/ML

## 2023-12-13 ENCOUNTER — RX RENEWAL (OUTPATIENT)
Age: 68
End: 2023-12-13

## 2023-12-13 RX ORDER — LETROZOLE TABLETS 2.5 MG/1
2.5 TABLET, FILM COATED ORAL
Qty: 30 | Refills: 3 | Status: ACTIVE | COMMUNITY
Start: 2022-08-24 | End: 1900-01-01

## 2024-02-15 ENCOUNTER — APPOINTMENT (OUTPATIENT)
Dept: SURGERY | Facility: CLINIC | Age: 69
End: 2024-02-15
Payer: MEDICARE

## 2024-02-15 VITALS — HEIGHT: 66 IN | BODY MASS INDEX: 32.14 KG/M2 | WEIGHT: 200 LBS

## 2024-02-15 PROCEDURE — 99204 OFFICE O/P NEW MOD 45 MIN: CPT

## 2024-02-18 RX ORDER — METOPROLOL SUCCINATE 50 MG/1
50 TABLET, EXTENDED RELEASE ORAL
Refills: 0 | Status: ACTIVE | COMMUNITY

## 2024-02-18 NOTE — PHYSICAL EXAM
[de-identified] : no cervical or supraclavicular adenopathy, trachea midline, thyroid without enlargement or palpable mass [Normal] : no neck adenopathy [de-identified] : Skin:  normal appearance.  no rash, nodules, vesicles, or erythema, Musculoskeletal:  full range of motion and no deformities appreciated Neurological:  grossly intact Psychiatric:  oriented to person, place and time with appropriate affect

## 2024-02-18 NOTE — CONSULT LETTER
[Dear  ___] : Dear  [unfilled], [Consult Letter:] : I had the pleasure of evaluating your patient, [unfilled]. [Please see my note below.] : Please see my note below. [Consult Closing:] : Thank you very much for allowing me to participate in the care of this patient.  If you have any questions, please do not hesitate to contact me. [Sincerely,] : Sincerely, [FreeTextEntry2] : Dr. Inna Moyer [FreeTextEntry3] : Raisa Mott MD, FACS Assistant Professor of Surgery and Otolaryngology [facility] [DrCary  ___] : Dr. LEÓN

## 2024-02-18 NOTE — HISTORY OF PRESENT ILLNESS
[de-identified] : Patient referred by Dr. Moyer for evaluation of primary hyperparathyroidism.  Patient reports over 10-year history of elevated calcium with hypertension, reflux, fatigue and osteoporosis.  Patient denies fracture, kidney stones, increased thirst, dysphagia, change in voice or radiation exposure.  Blood work February 2024: Calcium 11.6, , vitamin D 7, creatinine 0.7, 24-hour urine calcium 156.  Bone density March 2023: Wrist T -2.5.  Cystoscopy may be single parathyroid contiguous with and extending well posteriorly from right mid to lower pole into the tracheoesophageal groove.  Thyroid ultrasound February 2024: No thyroid nodules, 9 x 8 x 9 mm oval structure posterior to right thyroid lobe possible parathyroid.  I have reviewed all old and new data and available images.

## 2024-02-18 NOTE — REASON FOR VISIT
[Initial Consultation] : an initial consultation for [FreeTextEntry2] : PHPT with osteoporosis [Other: _____] : [unfilled]

## 2024-02-18 NOTE — ASSESSMENT
[FreeTextEntry1] : Patient with primary hyperparathyroidism with calcium 11.6, osteoporosis of the wrist and worsening symptoms.  I have recommended a parathyroidectomy with intraoperative PTH monitoring.  I have reviewed the pathophysiology of the disease process, the area anatomy and the rationale for surgery.  I discussed the risks, benefits and alternative treatments which include but are not limited to bleeding, infection, numbness, hoarseness, hypocalcemia, scarring, and need for reoperation.  I have answered the patient's questions to their satisfaction.  The patient wishes to proceed with the recommended procedure.  They will contact my office to schedule surgery.

## 2024-02-27 ENCOUNTER — APPOINTMENT (OUTPATIENT)
Dept: GYNECOLOGIC ONCOLOGY | Facility: CLINIC | Age: 69
End: 2024-02-27
Payer: MEDICARE

## 2024-02-27 VITALS
HEART RATE: 65 BPM | BODY MASS INDEX: 31.66 KG/M2 | WEIGHT: 197 LBS | SYSTOLIC BLOOD PRESSURE: 160 MMHG | HEIGHT: 66 IN | DIASTOLIC BLOOD PRESSURE: 120 MMHG

## 2024-02-27 PROCEDURE — 99213 OFFICE O/P EST LOW 20 MIN: CPT

## 2024-02-27 RX ORDER — LETROZOLE TABLETS 2.5 MG/1
2.5 TABLET, FILM COATED ORAL DAILY
Qty: 30 | Refills: 4 | Status: ACTIVE | COMMUNITY
Start: 2022-05-18 | End: 1900-01-01

## 2024-02-27 NOTE — ASSESSMENT
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  Initial surgery 8/2016.  Surgery for recurrence 5/2022.  Tolerating letrozole well.  Stable .

## 2024-02-27 NOTE — PHYSICAL EXAM
[Chaperone Present] : A chaperone was present in the examining room during all aspects of the physical examination [Absent] : Adnexa(ae): Absent [Normal] : Recto-Vaginal Exam: Normal [Fully active, able to carry on all pre-disease performance without restriction] : Status 0 - Fully active, able to carry on all pre-disease performance without restriction [FreeTextEntry1] : Sherry [de-identified] : well healed vertical midline scar

## 2024-02-27 NOTE — HISTORY OF PRESENT ILLNESS
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 No adjuvant therapy.     = 19 6/17/2020  = 20 12/31/20 = 22 7/22/21  = 32 1/2022 = 35 2/2022 CA-125 = 44 8/2022 CA-125 = 50 11/2022 CA-125 = 50 2/2023  = 52 5/25/23 CA-125 = 54 8/2023 CA-125 = 52 11/2023 CA-125 = 58 2/2024  3/21/22- CT AP- 1.  Fusiform 14 x 11 x 10 mm RIGHT pelvic preperitoneal nodule (3/273) is in retrospect the same or minimally enlarged 3/19/2021. PET CT evaluation is recommended  3/29/22- PET/CT- 1. Mildly FDG avid hypodense right anterior pelvic wall/preperitoneal nodule.  Recurred 5/2022 ELAP, resection of abdominal wall mass, diagnostic laparoscopy TB rec: Aromatase Inhibitor   = 44 8/24/22 9/1/22 - CT A/P - right external iliac lymph node 1.2 x 0.9 is unchanged and corresponds to previous PET/CT finding  12/6/22 - PET/CT - interval disappearance of hypermetabolic anterior pelvic wall nodule prior to 3/29/22. Stable 1.2 x 1.1 cm right external iliac node  9/6/23 - PET/CT -  A partially FDG-avid distal right external iliac lymph node is not significantly changed in size and is mildly increased in metabolism. Small focus of recurrent disease is not excluded. Remainder study demonstrates no evidence of FDG-avid disease.    11/28/23: Here for surveillance visit. Feels well. No new abdominal or pelvic symptoms. Has been undergoing cardiac evaluation for palpitations. Had cath last week that was wnl. Had ablative procedure and will have a chip inserted for monitoring later this week.  Has hypercalcemia and needs to have parathyroid surgery once cardiac issues resolved.   She was seen by Dr. Mott 2/2024 - parathyroidectomy advised d/t primary hyperparathyroidism  Mammo 3/2023 - BIRADS 1

## 2024-03-01 ENCOUNTER — OUTPATIENT (OUTPATIENT)
Dept: OUTPATIENT SERVICES | Facility: HOSPITAL | Age: 69
LOS: 1 days | End: 2024-03-01
Payer: MEDICARE

## 2024-03-01 VITALS
HEART RATE: 80 BPM | SYSTOLIC BLOOD PRESSURE: 145 MMHG | OXYGEN SATURATION: 96 % | RESPIRATION RATE: 14 BRPM | WEIGHT: 197.09 LBS | TEMPERATURE: 97 F | DIASTOLIC BLOOD PRESSURE: 88 MMHG | HEIGHT: 65 IN

## 2024-03-01 DIAGNOSIS — Z98.890 OTHER SPECIFIED POSTPROCEDURAL STATES: Chronic | ICD-10-CM

## 2024-03-01 DIAGNOSIS — Z90.89 ACQUIRED ABSENCE OF OTHER ORGANS: Chronic | ICD-10-CM

## 2024-03-01 DIAGNOSIS — E21.0 PRIMARY HYPERPARATHYROIDISM: ICD-10-CM

## 2024-03-01 DIAGNOSIS — Z90.710 ACQUIRED ABSENCE OF BOTH CERVIX AND UTERUS: Chronic | ICD-10-CM

## 2024-03-01 DIAGNOSIS — Z87.81 PERSONAL HISTORY OF (HEALED) TRAUMATIC FRACTURE: Chronic | ICD-10-CM

## 2024-03-01 DIAGNOSIS — H26.9 UNSPECIFIED CATARACT: Chronic | ICD-10-CM

## 2024-03-01 DIAGNOSIS — Z96.651 PRESENCE OF RIGHT ARTIFICIAL KNEE JOINT: Chronic | ICD-10-CM

## 2024-03-01 DIAGNOSIS — R19.00 INTRA-ABDOMINAL AND PELVIC SWELLING, MASS AND LUMP, UNSPECIFIED SITE: Chronic | ICD-10-CM

## 2024-03-01 DIAGNOSIS — Z98.89 OTHER SPECIFIED POSTPROCEDURAL STATES: Chronic | ICD-10-CM

## 2024-03-01 DIAGNOSIS — E21.3 HYPERPARATHYROIDISM, UNSPECIFIED: ICD-10-CM

## 2024-03-01 LAB
ANION GAP SERPL CALC-SCNC: 12 MMOL/L — SIGNIFICANT CHANGE UP (ref 7–14)
BUN SERPL-MCNC: 14 MG/DL — SIGNIFICANT CHANGE UP (ref 7–23)
CALCIUM SERPL-MCNC: 11.6 MG/DL — HIGH (ref 8.4–10.5)
CHLORIDE SERPL-SCNC: 103 MMOL/L — SIGNIFICANT CHANGE UP (ref 98–107)
CO2 SERPL-SCNC: 25 MMOL/L — SIGNIFICANT CHANGE UP (ref 22–31)
CREAT SERPL-MCNC: 0.73 MG/DL — SIGNIFICANT CHANGE UP (ref 0.5–1.3)
EGFR: 90 ML/MIN/1.73M2 — SIGNIFICANT CHANGE UP
GLUCOSE SERPL-MCNC: 79 MG/DL — SIGNIFICANT CHANGE UP (ref 70–99)
HCT VFR BLD CALC: 37.7 % — SIGNIFICANT CHANGE UP (ref 34.5–45)
HGB BLD-MCNC: 11.9 G/DL — SIGNIFICANT CHANGE UP (ref 11.5–15.5)
MCHC RBC-ENTMCNC: 30.7 PG — SIGNIFICANT CHANGE UP (ref 27–34)
MCHC RBC-ENTMCNC: 31.6 GM/DL — LOW (ref 32–36)
MCV RBC AUTO: 97.4 FL — SIGNIFICANT CHANGE UP (ref 80–100)
NRBC # BLD: 0 /100 WBCS — SIGNIFICANT CHANGE UP (ref 0–0)
NRBC # FLD: 0 K/UL — SIGNIFICANT CHANGE UP (ref 0–0)
PLATELET # BLD AUTO: 183 K/UL — SIGNIFICANT CHANGE UP (ref 150–400)
POTASSIUM SERPL-MCNC: 4.6 MMOL/L — SIGNIFICANT CHANGE UP (ref 3.5–5.3)
POTASSIUM SERPL-SCNC: 4.6 MMOL/L — SIGNIFICANT CHANGE UP (ref 3.5–5.3)
RBC # BLD: 3.87 M/UL — SIGNIFICANT CHANGE UP (ref 3.8–5.2)
RBC # FLD: 13 % — SIGNIFICANT CHANGE UP (ref 10.3–14.5)
SODIUM SERPL-SCNC: 140 MMOL/L — SIGNIFICANT CHANGE UP (ref 135–145)
WBC # BLD: 3.45 K/UL — LOW (ref 3.8–10.5)
WBC # FLD AUTO: 3.45 K/UL — LOW (ref 3.8–10.5)

## 2024-03-01 PROCEDURE — 93010 ELECTROCARDIOGRAM REPORT: CPT

## 2024-03-01 NOTE — H&P PST ADULT - LAST CARDIAC ANGIOGRAM/IMAGING
Angiogram 2021 ; pt denies h/o cardiac stents   CT heart 2022 2024 [facility] no stents placed

## 2024-03-01 NOTE — H&P PST ADULT - BMI (KG/M2)
Patient presents from home with a lace to the top of his head.  He states that he fall, hit the TV and the tv fell on his head.  Neighbor found him sitting in a chair holding a towel to his head.   32.8

## 2024-03-01 NOTE — H&P PST ADULT - ENDOCRINE COMMENTS
hypothyroidism on synthroid reports recently lowered by Dr. Inna Moyer [facility], reports repeat labs nl

## 2024-03-01 NOTE — H&P PST ADULT - SUPRACLAVICULAR L
52 year old male with ESRD on HD, now s/p ddrt on 12/8/2019 course complicated by TMA/profound ATN, who presents from a HD center with a fever and cough. He was initially admitted with influenza. He is s/p washout of infected collection and biopsy which revealed ATN.   He is now found to have a diffusely enlarging perinephric fluid collection and is awaiting drainage.    While on telemetry, he had an episode of a wide complex tachycardia. It initially appears irregular, suggesting an atrial arrhythmia with aberrancy, though the remainder appears more like an episode of non-sustained VT.   s/p washout on 1/1/2020 with worsening sepsis and transfer to ICU, s/p IR drainage of perinephric collection on 1/6. Pt developed acute hemorrhage secondary to pseudoaneurysm of right external iliac artery- transplant renal artery anastomosis. He underwent operative repair of right external iliac artery with pericardial patch, transplant nephrectomy, RLE thrombectomy, and IVC filter on 1/9.     Now s/p corrina ->tachy arrest with tdp, af rvr, now on amio, levo gtts, vented, with large PE    -critical events noted. now off of pressor support. Bp still soft.   -developed malignant arrhythmias which seemed to start with bradycardia, leading to more tachycardia and bursts of VT and rapid AF.  The apparent af degenerated into torsades.  He was shocked and has generally been maintaining sr since  -cont amio gtt for now  -arrhythmias were most likely triggered by the massive vte event, are unlikely to represent a primary cardiac issue  -cannot exclude an ischemic contribution, though the likelihood of that is small overall.  -hs trop noted, though in the context of his arrhythmias, and right heart strain from the pe, the trop is more likely to reflect demand than an acs event  -would not pursue cath now, though that is something that we could potentially consider pending his clinical course. his prior ischemic workup is unknown but was likely extensive  -ideally would be on asa, statin when able    -Cont on mechanical ventilatory support. Wean as tolerated.     -vasc medicine to evaluate but for now heparin gtt remains appropriate and the source of the thrombus is known  -echo results noted, with preserved/hyperdynamic lv, with RV failure consistent with acute RV overload  - Further cardiac workup will depend on clinical course.   - Patient at high risk for decompensation given the above comorbidities and active medical issues. Critically ill. I have personally spent >35 minutes  of critical care time in examining patient, reviewing chart, discussing care/management with patient/family, and  ICU team. normal

## 2024-03-01 NOTE — H&P PST ADULT - NSICDXPASTSURGICALHX_GEN_ALL_CORE_FT
PAST SURGICAL HISTORY:  Cataract s/p Left Cataract Excision  4/13/22    H/O arthroscopy of knee 2/22    H/O fracture of wrist ORIF Right Wrist 2012    History of incisional hernia repair 2019    s/p (R) ACL repair Right 2000    S/P colonoscopy 2019    s/p surgical correction of pleural effusion 30 years ago and Chest tube x15 days initially     S/P SAM-BSO 8/2016    S/P tonsillectomy 1962    S/P total knee replacement, right 1/14/21    S/P wrist surgery ORIF right wrist 2012     PAST SURGICAL HISTORY:  Cataract s/p Left Cataract Excision  4/13/22    H/O arthroscopy of knee 2/22    H/O fracture of wrist ORIF Right Wrist 2012    History of incisional hernia repair 2019    History of loop recorder     Pelvic mass     s/p (R) ACL repair Right 2000    s/p surgical correction of pleural effusion 30 years ago and Chest tube x15 days initially     S/P SAM-BSO 8/2016    S/P tonsillectomy 1962    S/P total knee replacement, right 1/14/21

## 2024-03-01 NOTE — H&P PST ADULT - HISTORY OF PRESENT ILLNESS
70y/o female scheduled for parathyroidectomy with parathyroid hormone assay on 3/6/2024.  Pt states, "hx of ovarian cancer, s/p eric, bso 2016 denies chemo/ radiation, recurrence s/p  Laparoscopic Resection of Pelvic mass 5/2022 placed on letrozole.  Has elevated calcium levels for the past 10 yrs." 68y/o female scheduled for parathyroidectomy with parathyroid hormone assay on 3/6/2024.  Pt states, "hx of ovarian cancer, s/p eric, bso 2016 denies chemo/ radiation, recurrence s/p  Laparoscopic Resection of Pelvic mass 5/2022 placed on letrozole.  Has elevated calcium levels for the past 10 yrs.  Underwent cardiac ablation for svt 12/2023, loop recorder placed 1/2024."

## 2024-03-01 NOTE — H&P PST ADULT - MUSCULOSKELETAL
details… ROM intact ROM intact/no joint swelling/normal gait/strength 5/5 bilateral upper extremities/strength 5/5 bilateral lower extremities

## 2024-03-01 NOTE — H&P PST ADULT - NEGATIVE GENERAL SYMPTOMS
no fever/no chills/no sweating/no anorexia/no weight loss no fever/no chills/no sweating/no anorexia/no weight loss/no weight gain/no polyphagia/no polyuria/no polydipsia/no malaise/no fatigue

## 2024-03-01 NOTE — H&P PST ADULT - NEGATIVE ENMT SYMPTOMS
no hearing difficulty/no ear pain/no tinnitus no hearing difficulty/no dry mouth/no throat pain/no dysphagia

## 2024-03-01 NOTE — H&P PST ADULT - CARDIOVASCULAR COMMENTS
OB Attending Note     Patient c/o rectal pressure.     SVE 6/100/-2    EFM category 1  Keachi ctx q2 mins    Plan: continue pitocin augmentation     E Balbir GUDINO 12/2023 s/p cardiac ablation for svt, loop recorder placed 1/2024, able to climb a flight of stairs left chest loop recorder

## 2024-03-01 NOTE — H&P PST ADULT - PROBLEM SELECTOR PLAN 1
Pt scheduled for parathyroidectomy with parathyroid hormone assay on 3/6/2024.  labs done results pending, ekg done.  Chlorhexidine provided-  written and verbal instructions given, with teach back, pt able to verbalize understanding.  Preop teaching done, pt able to verbalize understanding.   medication day of procedure- synthroid, metoprolol, nexium   pst request   echo 2023 [phone]  angiogram 2024 [phone]   cardiology last note Dr Padilla cardiologist

## 2024-03-01 NOTE — H&P PST ADULT - NSICDXPASTMEDICALHX_GEN_ALL_CORE_FT
Patient was a no show for her appt today with Dr. Ilda Sawyer. Spoke to patient who thought her appt was at 2:00 pm today. Patient rescheduled for 1-11-18. PAST MEDICAL HISTORY:  COVID 12/21    GERD (gastroesophageal reflux disease)     HTN (hypertension)     Hypothyroidism     Mass of ovary     Ovarian cancer S/P SAM-BSO no chemo or radiation  2016    Pleural effusion right lung, 1984    Right knee pain      PAST MEDICAL HISTORY:  Cataract     COVID 12/21    GERD (gastroesophageal reflux disease)     HTN (hypertension)     Hyperparathyroidism     Hypothyroidism     Mass of ovary     Ovarian cancer S/P SAM-BSO no chemo or radiation  2016    Pleural effusion right lung, 1984    Right knee pain     SVT (supraventricular tachycardia)

## 2024-03-01 NOTE — H&P PST ADULT - GASTROINTESTINAL
details… normal/soft/nontender/nondistended/normal active bowel sounds/no guarding/no rigidity/no organomegaly/no palpable jay/no masses palpable

## 2024-03-05 ENCOUNTER — TRANSCRIPTION ENCOUNTER (OUTPATIENT)
Age: 69
End: 2024-03-05

## 2024-03-05 NOTE — ASU PATIENT PROFILE, ADULT - ARRIVAL TIME
Ventricular Rate : 122   Atrial Rate : 122   P-R Interval : 171   QRS Duration : 74   Q-T Interval : 316   QTC Calculation(Bezet) : 450   P Axis : 67   R Axis : 49   T Axis : -1   Diagnosis : Sinus tachycardia~Low voltage, extremity leads~Left atrial enlargement~N/S lateral T flattening~Confirmed by Ronnie De Luna ([id number]) on 6/18/2018 7:52:39 PM     
05:15

## 2024-03-05 NOTE — ASU PATIENT PROFILE, ADULT - NSICDXPASTSURGICALHX_GEN_ALL_CORE_FT
PAST SURGICAL HISTORY:  Cataract s/p Left Cataract Excision  4/13/22    H/O arthroscopy of knee 2/22    H/O fracture of wrist ORIF Right Wrist 2012    History of incisional hernia repair 2019    History of loop recorder     Pelvic mass     s/p (R) ACL repair Right 2000    s/p surgical correction of pleural effusion 30 years ago and Chest tube x15 days initially     S/P SAM-BSO 8/2016    S/P tonsillectomy 1962    S/P total knee replacement, right 1/14/21

## 2024-03-05 NOTE — ASU PATIENT PROFILE, ADULT - NSICDXPASTMEDICALHX_GEN_ALL_CORE_FT
PAST MEDICAL HISTORY:  Cataract     COVID 12/21    GERD (gastroesophageal reflux disease)     HTN (hypertension)     Hyperparathyroidism     Hypothyroidism     Mass of ovary     Ovarian cancer S/P SAM-BSO no chemo or radiation  2016    Pleural effusion right lung, 1984    Right knee pain     SVT (supraventricular tachycardia)

## 2024-03-06 ENCOUNTER — TRANSCRIPTION ENCOUNTER (OUTPATIENT)
Age: 69
End: 2024-03-06

## 2024-03-06 ENCOUNTER — APPOINTMENT (OUTPATIENT)
Dept: SURGERY | Facility: HOSPITAL | Age: 69
End: 2024-03-06
Payer: MEDICARE

## 2024-03-06 ENCOUNTER — OUTPATIENT (OUTPATIENT)
Dept: OUTPATIENT SERVICES | Facility: HOSPITAL | Age: 69
LOS: 1 days | Discharge: HOME | End: 2024-03-06
Payer: MEDICARE

## 2024-03-06 ENCOUNTER — RESULT REVIEW (OUTPATIENT)
Age: 69
End: 2024-03-06

## 2024-03-06 VITALS
DIASTOLIC BLOOD PRESSURE: 75 MMHG | SYSTOLIC BLOOD PRESSURE: 153 MMHG | OXYGEN SATURATION: 96 % | HEART RATE: 75 BPM | RESPIRATION RATE: 16 BRPM

## 2024-03-06 VITALS
HEIGHT: 65 IN | SYSTOLIC BLOOD PRESSURE: 143 MMHG | OXYGEN SATURATION: 99 % | HEART RATE: 70 BPM | RESPIRATION RATE: 16 BRPM | WEIGHT: 197.09 LBS | DIASTOLIC BLOOD PRESSURE: 99 MMHG | TEMPERATURE: 98 F

## 2024-03-06 DIAGNOSIS — Z90.89 ACQUIRED ABSENCE OF OTHER ORGANS: Chronic | ICD-10-CM

## 2024-03-06 DIAGNOSIS — R19.00 INTRA-ABDOMINAL AND PELVIC SWELLING, MASS AND LUMP, UNSPECIFIED SITE: Chronic | ICD-10-CM

## 2024-03-06 DIAGNOSIS — H26.9 UNSPECIFIED CATARACT: Chronic | ICD-10-CM

## 2024-03-06 DIAGNOSIS — Z98.890 OTHER SPECIFIED POSTPROCEDURAL STATES: Chronic | ICD-10-CM

## 2024-03-06 DIAGNOSIS — E21.0 PRIMARY HYPERPARATHYROIDISM: ICD-10-CM

## 2024-03-06 DIAGNOSIS — Z90.710 ACQUIRED ABSENCE OF BOTH CERVIX AND UTERUS: Chronic | ICD-10-CM

## 2024-03-06 DIAGNOSIS — Z96.651 PRESENCE OF RIGHT ARTIFICIAL KNEE JOINT: Chronic | ICD-10-CM

## 2024-03-06 DIAGNOSIS — Z87.81 PERSONAL HISTORY OF (HEALED) TRAUMATIC FRACTURE: Chronic | ICD-10-CM

## 2024-03-06 LAB
PTH INTACT, INTRAOP SPECIMEN 2: SIGNIFICANT CHANGE UP
PTH INTACT, INTRAOP SPECIMEN 3: SIGNIFICANT CHANGE UP
PTH INTACT, INTRAOP SPECIMEN 4: SIGNIFICANT CHANGE UP
PTH INTACT, INTRAOP TIMING 2: SIGNIFICANT CHANGE UP
PTH INTACT, INTRAOP TIMING 3: SIGNIFICANT CHANGE UP
PTH INTACT, INTRAOP TIMING 4: SIGNIFICANT CHANGE UP
PTH INTACT, INTRAOPERATIVE 2: 91 PG/ML — HIGH (ref 15–65)
PTH INTACT, INTRAOPERATIVE 3: 34 PG/ML — SIGNIFICANT CHANGE UP (ref 15–65)
PTH INTACT, INTRAOPERATIVE 4: 19 PG/ML — SIGNIFICANT CHANGE UP (ref 15–65)
PTH-INTACT IO % DIF SERPL: 100 PG/ML — HIGH (ref 15–65)
PTH-INTACT SP1 SERPL-MCNC: SIGNIFICANT CHANGE UP

## 2024-03-06 PROCEDURE — 88305 TISSUE EXAM BY PATHOLOGIST: CPT | Mod: 26

## 2024-03-06 PROCEDURE — 88331 PATH CONSLTJ SURG 1 BLK 1SPC: CPT | Mod: 26

## 2024-03-06 PROCEDURE — 60500 EXPLORE PARATHYROID GLANDS: CPT

## 2024-03-06 PROCEDURE — 88334 PATH CONSLTJ SURG CYTO XM EA: CPT | Mod: 26,59

## 2024-03-06 RX ORDER — LETROZOLE 2.5 MG/1
1 TABLET, FILM COATED ORAL
Refills: 0 | DISCHARGE

## 2024-03-06 RX ORDER — ESOMEPRAZOLE MAGNESIUM 40 MG/1
1 CAPSULE, DELAYED RELEASE ORAL
Refills: 0 | DISCHARGE

## 2024-03-06 RX ORDER — CALCITRIOL 0.5 UG/1
1 CAPSULE ORAL
Qty: 60 | Refills: 0
Start: 2024-03-06

## 2024-03-06 RX ORDER — ACETAMINOPHEN 500 MG
650 TABLET ORAL EVERY 6 HOURS
Refills: 0 | Status: DISCONTINUED | OUTPATIENT
Start: 2024-03-06 | End: 2024-03-20

## 2024-03-06 RX ORDER — METOCLOPRAMIDE HCL 10 MG
10 TABLET ORAL ONCE
Refills: 0 | Status: DISCONTINUED | OUTPATIENT
Start: 2024-03-06 | End: 2024-03-20

## 2024-03-06 RX ORDER — FENTANYL CITRATE 50 UG/ML
25 INJECTION INTRAVENOUS
Refills: 0 | Status: DISCONTINUED | OUTPATIENT
Start: 2024-03-06 | End: 2024-03-06

## 2024-03-06 RX ORDER — OXYCODONE HYDROCHLORIDE 5 MG/1
5 TABLET ORAL ONCE
Refills: 0 | Status: DISCONTINUED | OUTPATIENT
Start: 2024-03-06 | End: 2024-03-06

## 2024-03-06 RX ORDER — CALCITRIOL 0.5 UG/1
0.5 CAPSULE ORAL
Refills: 0 | Status: DISCONTINUED | OUTPATIENT
Start: 2024-03-06 | End: 2024-03-20

## 2024-03-06 RX ORDER — SODIUM CHLORIDE 9 MG/ML
1000 INJECTION, SOLUTION INTRAVENOUS
Refills: 0 | Status: DISCONTINUED | OUTPATIENT
Start: 2024-03-06 | End: 2024-03-20

## 2024-03-06 RX ORDER — METOPROLOL TARTRATE 50 MG
1 TABLET ORAL
Refills: 0 | DISCHARGE

## 2024-03-06 RX ORDER — ONDANSETRON 8 MG/1
4 TABLET, FILM COATED ORAL ONCE
Refills: 0 | Status: DISCONTINUED | OUTPATIENT
Start: 2024-03-06 | End: 2024-03-20

## 2024-03-06 RX ORDER — BENZOCAINE AND MENTHOL 5; 1 G/100ML; G/100ML
1 LIQUID ORAL
Refills: 0 | Status: DISCONTINUED | OUTPATIENT
Start: 2024-03-06 | End: 2024-03-20

## 2024-03-06 RX ORDER — LEVOTHYROXINE SODIUM 125 MCG
1 TABLET ORAL
Refills: 0 | DISCHARGE

## 2024-03-06 RX ORDER — FENTANYL CITRATE 50 UG/ML
50 INJECTION INTRAVENOUS
Refills: 0 | Status: DISCONTINUED | OUTPATIENT
Start: 2024-03-06 | End: 2024-03-06

## 2024-03-06 RX ORDER — ACETAMINOPHEN 500 MG
2 TABLET ORAL
Qty: 0 | Refills: 0 | DISCHARGE
Start: 2024-03-06

## 2024-03-06 RX ADMIN — CALCITRIOL 0.5 MICROGRAM(S): 0.5 CAPSULE ORAL at 10:10

## 2024-03-06 RX ADMIN — BENZOCAINE AND MENTHOL 1 LOZENGE: 5; 1 LIQUID ORAL at 09:50

## 2024-03-06 RX ADMIN — Medication 2 TABLET(S): at 10:10

## 2024-03-06 NOTE — ASU DISCHARGE PLAN (ADULT/PEDIATRIC) - ASU DC SPECIAL INSTRUCTIONSFT
- Wear a supportive bra for 24 hours per day for at least 2 weeks. This will prevent tension on the neck which will let the scar heal properly.   - Take tylenol 500 mg or 650 mg every 6 hours if you have post operative pain  - Do not take NSAID medications. This includes Advil, Aleve, Excedrin, ibuprofen, etc.   - Start taking calcium 500 mg or 600 mg two tablets three times daily  - Start taking calcitriol 0.5 mcg two times daily. This was sent to your pharmacy. 61 No

## 2024-03-06 NOTE — ASU DISCHARGE PLAN (ADULT/PEDIATRIC) - PROVIDER TOKENS
PROVIDER:[TOKEN:[[id number]],SCHEDULEDAPPT:[03/14/2024]] Xeljanz Counseling: I discussed with the patient the risks of Xeljanz therapy including increased risk of infection, liver issues, headache, diarrhea, or cold symptoms. Live vaccines should be avoided. They were instructed to call if they have any problems.

## 2024-03-06 NOTE — ASU DISCHARGE PLAN (ADULT/PEDIATRIC) - NURSING INSTRUCTIONS
Last dose of TYLENOL for pain management was at 08:15 AM. Next dose of TYLENOL may be taken at or after 2:15 PM if needed. DO NOT take any additional products containing TYLENOL or ACETAMINOPHEN, such as VICODIN, PERCOCET, NORCO, EXCEDRIN, and any over-the-counter cold medications. DO NOT CONSUME MORE THAN 5613-3846 MG OF TYLENOL (acetaminophen) in a 24-hour period.

## 2024-03-06 NOTE — ASU PREOP CHECKLIST - 1.
Hx of HTN on amlodipine and valasartan last admission. Held in setting of presenting sepsis and remains normotensive.  -will continue to hold and monitor HD. Hx of HTN on amlodipine and valasartan last admission.  - c/w home amlodipine 10mg, currently normotensive locker 1

## 2024-03-06 NOTE — ASU DISCHARGE PLAN (ADULT/PEDIATRIC) - CARE PROVIDER_API CALL
Raisa Mott Alexandra  Surgery  [address] [place] [address], [facility] [address]  [place] [address]  Phone: [phone]  Fax: [phone]  Scheduled Appointment: 03/14/2024

## 2024-03-08 PROBLEM — E21.3 HYPERPARATHYROIDISM, UNSPECIFIED: Chronic | Status: ACTIVE | Noted: 2024-03-01

## 2024-03-08 PROBLEM — I47.10 SUPRAVENTRICULAR TACHYCARDIA, UNSPECIFIED: Chronic | Status: ACTIVE | Noted: 2024-03-01

## 2024-03-12 LAB — SURGICAL PATHOLOGY STUDY: SIGNIFICANT CHANGE UP

## 2024-03-14 ENCOUNTER — APPOINTMENT (OUTPATIENT)
Dept: SURGERY | Facility: CLINIC | Age: 69
End: 2024-03-14
Payer: MEDICARE

## 2024-03-14 PROCEDURE — 99024 POSTOP FOLLOW-UP VISIT: CPT

## 2024-03-14 PROCEDURE — 36415 COLL VENOUS BLD VENIPUNCTURE: CPT

## 2024-03-14 NOTE — ASSESSMENT
[FreeTextEntry1] : Patient here for post op visit s/p right superior parathyroidectomy on 3/6/24. Steri strips removed, surgical incision healing well. Advised patient that she can decrease calcium supplementation to 1000 mg once daily and to continue calcitriol 0.5 mcg twice daily. Will update labs today to assess current level of PTH, Ca, and Vitamin D. Scar minimizing techniques reviewed with patient. Follow up in 6 weeks. I have answered their questions to the best of my ability.

## 2024-03-14 NOTE — PHYSICAL EXAM
[Midline] : located in midline position [Normal] : orientation to person, place, and time: normal [de-identified] : Steri strips removed. Surgical incision cite appears to be healing well without significant swelling or erythema. Patient moves neck comfortably.  [FreeTextEntry1] : Patient is not in acute distress, sits comfortably in exam room [de-identified] : Grossly intact [de-identified] : Skin: no rashes or lesions  Psych: normal and appropriate affect

## 2024-03-14 NOTE — HISTORY OF PRESENT ILLNESS
[de-identified] : Patient referred by Dr. Moyer for evaluation of primary hyperparathyroidism. Patient reports over 10-year history of elevated calcium with hypertension, reflux, fatigue and osteoporosis. Patient denies fracture, kidney stones, increased thirst, dysphagia, change in voice or radiation exposure. Blood work February 2024: Calcium 11.6, , vitamin D 7, creatinine 0.7, 24-hour urine calcium 156. Bone density March 2023: Wrist T -2.5. Sestamibi scan showed may be single parathyroid contiguous with and extending well posteriorly from right mid to lower pole into the tracheoesophageal groove. Thyroid ultrasound February 2024: No thyroid nodules, 9 x 8 x 9 mm oval structure posterior to right thyroid lobe possible parathyroid. Patient underwent right superior parathyroidectomy on 3/6/24. Intraop PTH levels with baseline of 100 and decrease to 19 after 10 minutes post excision. Final pathology revealed hypercellular parathyroid tissue. She is feeling well post-operatively. She denies hoarseness, dysphagia, shortness of breath, fevers, neck swelling, parasthesias. She is taking calcium 1000 mg three times daily and calcitriol 0.5 mcg twice daily.  I have reviewed all old and new data and available images.

## 2024-03-15 ENCOUNTER — NON-APPOINTMENT (OUTPATIENT)
Age: 69
End: 2024-03-15

## 2024-03-17 LAB
25(OH)D3 SERPL-MCNC: 21.6 NG/ML
CALCIUM SERPL-MCNC: 10 MG/DL
CALCIUM SERPL-MCNC: 10 MG/DL
PARATHYROID HORMONE INTACT: 19 PG/ML

## 2024-04-26 ENCOUNTER — APPOINTMENT (OUTPATIENT)
Dept: MAMMOGRAPHY | Facility: CLINIC | Age: 69
End: 2024-04-26

## 2024-04-30 ENCOUNTER — APPOINTMENT (OUTPATIENT)
Dept: SURGERY | Facility: CLINIC | Age: 69
End: 2024-04-30
Payer: MEDICARE

## 2024-04-30 DIAGNOSIS — M81.0 AGE-RELATED OSTEOPOROSIS W/OUT CURRENT PATHOLOGICAL FRACTURE: ICD-10-CM

## 2024-04-30 DIAGNOSIS — E21.0 PRIMARY HYPERPARATHYROIDISM: ICD-10-CM

## 2024-04-30 PROCEDURE — 99024 POSTOP FOLLOW-UP VISIT: CPT

## 2024-04-30 PROCEDURE — 36415 COLL VENOUS BLD VENIPUNCTURE: CPT

## 2024-04-30 NOTE — HISTORY OF PRESENT ILLNESS
[No] : patient does not have a  history of radiation therapy [de-identified] :  Patient referred by Dr. Moyer for evaluation of primary hyperparathyroidism. Patient reports over 10-year history of elevated calcium with hypertension, reflux, fatigue and osteoporosis. Patient denies fracture, kidney stones, increased thirst, or radiation exposure. Blood work February 2024: Calcium 11.6, , vitamin D 7, creatinine 0.7, 24-hour urine calcium 156. Bone density March 2023: Wrist T -2.5. Sestamibi scan showed may be single parathyroid contiguous with and extending well posteriorly from right mid to lower pole into the tracheoesophageal groove. Thyroid ultrasound February 2024: No thyroid nodules, 9 x 8 x 9 mm oval structure posterior to right thyroid lobe possible parathyroid. Patient underwent right superior parathyroidectomy on 3/6/24. Intraop PTH levels with baseline of 100 and decrease to 19 after 10 minutes post excision. Final pathology revealed hypercellular parathyroid tissue. She is feeling well post-operatively. She denies hoarseness, dysphagia, shortness of breath, fevers, neck swelling, paresthesia's. She is taking calcium 1800 mg once daily. She had labs done in April 12 2024 which showed Calcium of 8.8 mcg, vitamin D of 32, and PTH of 62. Patient has had some muscle cramping in her arms and legs. She was advised to increase calcium supplement from 1200 mg to 1800 mg by her endocrinologist Dr. Moyer. I have reviewed all old and new data and available images.

## 2024-04-30 NOTE — PHYSICAL EXAM
[Midline] : located in midline position [Normal] : orientation to person, place, and time: normal [de-identified] : Surgical incision site at anterior midline of neck healing well without any erythema, swelling or drainage [de-identified] : Grossly intact [de-identified] : Skin: no rashes or lesions Psych: normal and appropriate affect

## 2024-04-30 NOTE — ASSESSMENT
[FreeTextEntry1] : Patient here for postop visit s/p right superior parathyroidectomy on 3/6/24 for treatment of primary hyperparathyroidism. She is doing well postop. She is having some muscle cramping and calcium level was on lower end of normal at 8.8 a few weeks ago. She is currently taking 1800 mg of calcium daily.  Aissatou sent today to assess current levels and will adjust supplement accordingly. Answered patient's questions to best of my ability.  Follow up in 4 months.

## 2024-05-01 ENCOUNTER — NON-APPOINTMENT (OUTPATIENT)
Age: 69
End: 2024-05-01

## 2024-05-02 LAB
25(OH)D3 SERPL-MCNC: 35.7 NG/ML
CALCIUM SERPL-MCNC: 9.4 MG/DL
CALCIUM SERPL-MCNC: 9.4 MG/DL
MAGNESIUM SERPL-MCNC: 1.9 MG/DL
PARATHYROID HORMONE INTACT: 36 PG/ML
POTASSIUM SERPL-SCNC: 4.5 MMOL/L

## 2024-05-14 ENCOUNTER — APPOINTMENT (OUTPATIENT)
Dept: NUCLEAR MEDICINE | Facility: CLINIC | Age: 69
End: 2024-05-14
Payer: MEDICARE

## 2024-05-14 PROCEDURE — 78815 PET IMAGE W/CT SKULL-THIGH: CPT | Mod: PS

## 2024-05-14 PROCEDURE — A9552: CPT

## 2024-05-15 ENCOUNTER — APPOINTMENT (OUTPATIENT)
Dept: MAMMOGRAPHY | Facility: CLINIC | Age: 69
End: 2024-05-15
Payer: MEDICARE

## 2024-05-15 ENCOUNTER — RESULT REVIEW (OUTPATIENT)
Age: 69
End: 2024-05-15

## 2024-05-15 PROCEDURE — 77063 BREAST TOMOSYNTHESIS BI: CPT

## 2024-05-15 PROCEDURE — 77067 SCR MAMMO BI INCL CAD: CPT

## 2024-05-21 NOTE — HISTORY OF PRESENT ILLNESS
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 No adjuvant therapy.    = 19 6/17/2020  = 20 12/31/20 = 22 7/22/21  = 32 1/2022 = 35 2/2022 CA-125 = 44 8/2022 CA-125 = 50 11/2022 CA-125 = 50 2/2023  = 52 5/25/23 CA-125 = 54 8/2023 CA-125 = 52 11/2023 CA-125 = 58 2/2024  3/21/22- CT AP- 1.  Fusiform 14 x 11 x 10 mm RIGHT pelvic preperitoneal nodule (3/273) is in retrospect the same or minimally enlarged 3/19/2021. PET CT evaluation is recommended  3/29/22- PET/CT- 1. Mildly FDG avid hypodense right anterior pelvic wall/preperitoneal nodule.  Recurred 5/2022 ELAP, resection of abdominal wall mass, diagnostic laparoscopy TB rec: Aromatase Inhibitor   = 44 8/24/22 9/1/22 - CT A/P - right external iliac lymph node 1.2 x 0.9 is unchanged and corresponds to previous PET/CT finding  12/6/22 - PET/CT - interval disappearance of hypermetabolic anterior pelvic wall nodule prior to 3/29/22. Stable 1.2 x 1.1 cm right external iliac node  9/6/23 - PET/CT - A partially FDG-avid distal right external iliac lymph node is not significantly changed in size and is mildly increased in metabolism. Small focus of recurrent disease is not excluded. Remainder study demonstrates no evidence of FDG-avid disease.  11/28/23: Here for surveillance visit. Feels well. No new abdominal or pelvic symptoms. Has been undergoing cardiac evaluation for palpitations. Had cath last week that was wnl. Had ablative procedure and will have a chip inserted for monitoring later this week.  Has hypercalcemia and needs to have parathyroid surgery once cardiac issues resolved.   She was seen by Dr. Mott 2/2024 - parathyroidectomy advised d/t primary hyperparathyroidism  Mammo 5/2024 - BIRADS 1

## 2024-05-21 NOTE — PHYSICAL EXAM
[Chaperone Present] : A chaperone was present in the examining room during all aspects of the physical examination [Absent] : Adnexa(ae): Absent [Normal] : Recto-Vaginal Exam: Normal [de-identified] : well healed vertical midline scar [Fully active, able to carry on all pre-disease performance without restriction] : Status 0 - Fully active, able to carry on all pre-disease performance without restriction

## 2024-05-28 ENCOUNTER — APPOINTMENT (OUTPATIENT)
Dept: ULTRASOUND IMAGING | Facility: CLINIC | Age: 69
End: 2024-05-28
Payer: MEDICARE

## 2024-05-28 ENCOUNTER — APPOINTMENT (OUTPATIENT)
Dept: GYNECOLOGIC ONCOLOGY | Facility: CLINIC | Age: 69
End: 2024-05-28

## 2024-05-28 DIAGNOSIS — C56.9 MALIGNANT NEOPLASM OF UNSPECIFIED OVARY: ICD-10-CM

## 2024-05-28 PROCEDURE — 93971 EXTREMITY STUDY: CPT | Mod: RT

## 2024-08-27 ENCOUNTER — APPOINTMENT (OUTPATIENT)
Dept: GYNECOLOGIC ONCOLOGY | Facility: CLINIC | Age: 69
End: 2024-08-27
Payer: MEDICARE

## 2024-08-27 VITALS
TEMPERATURE: 97.6 F | HEIGHT: 66 IN | OXYGEN SATURATION: 98 % | DIASTOLIC BLOOD PRESSURE: 97 MMHG | SYSTOLIC BLOOD PRESSURE: 148 MMHG | HEART RATE: 133 BPM | WEIGHT: 187 LBS | BODY MASS INDEX: 30.05 KG/M2

## 2024-08-27 DIAGNOSIS — C56.9 MALIGNANT NEOPLASM OF UNSPECIFIED OVARY: ICD-10-CM

## 2024-08-27 PROCEDURE — 99213 OFFICE O/P EST LOW 20 MIN: CPT

## 2024-08-27 PROCEDURE — G2211 COMPLEX E/M VISIT ADD ON: CPT

## 2024-08-27 PROCEDURE — 99459 PELVIC EXAMINATION: CPT

## 2024-08-27 NOTE — PHYSICAL EXAM
[Chaperone Present] : A chaperone was present in the examining room during all aspects of the physical examination [Absent] : Adnexa(ae): Absent [Normal] : Recto-Vaginal Exam: Normal [Fully active, able to carry on all pre-disease performance without restriction] : Status 0 - Fully active, able to carry on all pre-disease performance without restriction [29638] : A chaperone was present during the pelvic exam. [FreeTextEntry2] : Rita [de-identified] : well healed vertical midline scar

## 2024-08-27 NOTE — HISTORY OF PRESENT ILLNESS
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 No adjuvant therapy.     = 19 6/17/2020  = 20 12/31/20 = 22 7/22/21  = 32 1/2022 = 35 2/2022 CA-125 = 44 8/2022 CA-125 = 50 11/2022 CA-125 = 50 2/2023  = 52 5/25/23 CA-125 = 54 8/2023 CA-125 = 53 5/2024    Recurred 5/2022 ELAP, resection of abdominal wall mass, diagnostic laparoscopy TB rec: Aromatase Inhibitor   = 44 8/24/22 9/1/22 - CT A/P - right external iliac lymph node 1.2 x 0.9 is unchanged and corresponds to previous PET/CT finding  5/24 - PET/CT - Stable 1.3 x 1.0 cm right external iliac node   Returns for surveillance visit.  Denies abdominal/pelvic pain, dyspnea or chest pain, vaginal bleeding or discharge, nausea/vomiting, changes in bowel habits or urination, lower extremity edema or pain.  Has hypercalcemia and needs to have parathyroid surgery once cardiac issues resolved.   Had a right knee replacement in January 2021. She arthroscopic revision in Feb 2022.  Mammo 3/2023 - BIRADS 1

## 2024-08-28 LAB — CANCER AG125 SERPL-ACNC: 83 U/ML

## 2024-08-29 ENCOUNTER — APPOINTMENT (OUTPATIENT)
Dept: SURGERY | Facility: CLINIC | Age: 69
End: 2024-08-29

## 2025-01-21 ENCOUNTER — APPOINTMENT (OUTPATIENT)
Dept: GYNECOLOGIC ONCOLOGY | Facility: CLINIC | Age: 70
End: 2025-01-21
Payer: MEDICARE

## 2025-01-21 VITALS
WEIGHT: 195 LBS | HEART RATE: 94 BPM | TEMPERATURE: 97.3 F | HEIGHT: 66 IN | SYSTOLIC BLOOD PRESSURE: 162 MMHG | DIASTOLIC BLOOD PRESSURE: 107 MMHG | OXYGEN SATURATION: 98 % | BODY MASS INDEX: 31.34 KG/M2

## 2025-01-21 DIAGNOSIS — C56.9 MALIGNANT NEOPLASM OF UNSPECIFIED OVARY: ICD-10-CM

## 2025-01-21 PROCEDURE — 99459 PELVIC EXAMINATION: CPT

## 2025-01-21 PROCEDURE — 99213 OFFICE O/P EST LOW 20 MIN: CPT

## 2025-01-21 PROCEDURE — G2211 COMPLEX E/M VISIT ADD ON: CPT

## 2025-03-04 ENCOUNTER — RX RENEWAL (OUTPATIENT)
Age: 70
End: 2025-03-04

## 2025-05-20 ENCOUNTER — OUTPATIENT (OUTPATIENT)
Dept: OUTPATIENT SERVICES | Facility: HOSPITAL | Age: 70
LOS: 1 days | Discharge: HOME | End: 2025-05-20
Payer: MEDICARE

## 2025-05-20 VITALS
TEMPERATURE: 97 F | HEIGHT: 66 IN | WEIGHT: 195.11 LBS | OXYGEN SATURATION: 100 % | DIASTOLIC BLOOD PRESSURE: 98 MMHG | SYSTOLIC BLOOD PRESSURE: 153 MMHG | HEART RATE: 95 BPM | RESPIRATION RATE: 20 BRPM

## 2025-05-20 DIAGNOSIS — Z98.890 OTHER SPECIFIED POSTPROCEDURAL STATES: Chronic | ICD-10-CM

## 2025-05-20 DIAGNOSIS — R19.00 INTRA-ABDOMINAL AND PELVIC SWELLING, MASS AND LUMP, UNSPECIFIED SITE: Chronic | ICD-10-CM

## 2025-05-20 DIAGNOSIS — Z96.651 PRESENCE OF RIGHT ARTIFICIAL KNEE JOINT: Chronic | ICD-10-CM

## 2025-05-20 DIAGNOSIS — Z80.0 FAMILY HISTORY OF MALIGNANT NEOPLASM OF DIGESTIVE ORGANS: ICD-10-CM

## 2025-05-20 DIAGNOSIS — Z90.710 ACQUIRED ABSENCE OF BOTH CERVIX AND UTERUS: Chronic | ICD-10-CM

## 2025-05-20 DIAGNOSIS — Z90.89 ACQUIRED ABSENCE OF OTHER ORGANS: Chronic | ICD-10-CM

## 2025-05-20 DIAGNOSIS — Z87.81 PERSONAL HISTORY OF (HEALED) TRAUMATIC FRACTURE: Chronic | ICD-10-CM

## 2025-05-20 DIAGNOSIS — Z86.0100 PERSONAL HISTORY OF COLON POLYPS, UNSPECIFIED: ICD-10-CM

## 2025-05-20 DIAGNOSIS — H26.9 UNSPECIFIED CATARACT: Chronic | ICD-10-CM

## 2025-05-20 PROCEDURE — C1889: CPT

## 2025-05-20 PROCEDURE — 88305 TISSUE EXAM BY PATHOLOGIST: CPT

## 2025-05-20 PROCEDURE — 88312 SPECIAL STAINS GROUP 1: CPT | Mod: 26

## 2025-05-20 PROCEDURE — 88312 SPECIAL STAINS GROUP 1: CPT

## 2025-05-20 PROCEDURE — 88305 TISSUE EXAM BY PATHOLOGIST: CPT | Mod: 26

## 2025-05-20 RX ORDER — LEVOTHYROXINE SODIUM 300 MCG
1 TABLET ORAL
Refills: 0 | DISCHARGE

## 2025-05-20 RX ORDER — LETROZOLE 2.5 MG/1
1 TABLET, FILM COATED ORAL
Refills: 0 | DISCHARGE

## 2025-05-20 NOTE — ASU PATIENT PROFILE, ADULT - NS PRO PASSIVE SMOKE EXP
Contacted patient. Informed per Dr. Peres's recommendations about Xray results.. Patient verbalized understanding.  
No

## 2025-05-20 NOTE — ASU PREOP CHECKLIST - VIA
Render Risk Assessment In Note?: no Additional Notes: Schedule 20 minute appt. Quoted pt $300 for full body tx of cherry angioma. Detail Level: Simple stretcher

## 2025-05-21 LAB — SURGICAL PATHOLOGY STUDY: SIGNIFICANT CHANGE UP

## 2025-05-27 DIAGNOSIS — Z96.651 PRESENCE OF RIGHT ARTIFICIAL KNEE JOINT: ICD-10-CM

## 2025-05-27 DIAGNOSIS — Z87.891 PERSONAL HISTORY OF NICOTINE DEPENDENCE: ICD-10-CM

## 2025-05-27 DIAGNOSIS — Z80.0 FAMILY HISTORY OF MALIGNANT NEOPLASM OF DIGESTIVE ORGANS: ICD-10-CM

## 2025-05-27 DIAGNOSIS — Z79.890 HORMONE REPLACEMENT THERAPY: ICD-10-CM

## 2025-05-27 DIAGNOSIS — I10 ESSENTIAL (PRIMARY) HYPERTENSION: ICD-10-CM

## 2025-05-27 DIAGNOSIS — Z86.0102 PERSONAL HISTORY OF HYPERPLASTIC COLON POLYPS: ICD-10-CM

## 2025-05-27 DIAGNOSIS — K29.50 UNSPECIFIED CHRONIC GASTRITIS WITHOUT BLEEDING: ICD-10-CM

## 2025-05-27 DIAGNOSIS — D50.9 IRON DEFICIENCY ANEMIA, UNSPECIFIED: ICD-10-CM

## 2025-05-27 DIAGNOSIS — K57.30 DIVERTICULOSIS OF LARGE INTESTINE WITHOUT PERFORATION OR ABSCESS WITHOUT BLEEDING: ICD-10-CM

## 2025-05-27 DIAGNOSIS — Z88.5 ALLERGY STATUS TO NARCOTIC AGENT: ICD-10-CM

## 2025-05-27 DIAGNOSIS — K31.7 POLYP OF STOMACH AND DUODENUM: ICD-10-CM

## 2025-05-27 DIAGNOSIS — E03.9 HYPOTHYROIDISM, UNSPECIFIED: ICD-10-CM

## 2025-06-24 ENCOUNTER — APPOINTMENT (OUTPATIENT)
Dept: GYNECOLOGIC ONCOLOGY | Facility: CLINIC | Age: 70
End: 2025-06-24
Payer: MEDICARE

## 2025-06-24 VITALS
RESPIRATION RATE: 16 BRPM | WEIGHT: 194 LBS | OXYGEN SATURATION: 98 % | DIASTOLIC BLOOD PRESSURE: 106 MMHG | HEART RATE: 93 BPM | SYSTOLIC BLOOD PRESSURE: 163 MMHG | BODY MASS INDEX: 31.18 KG/M2 | HEIGHT: 66 IN | TEMPERATURE: 97.7 F

## 2025-06-24 PROCEDURE — 99213 OFFICE O/P EST LOW 20 MIN: CPT

## 2025-06-24 PROCEDURE — G2211 COMPLEX E/M VISIT ADD ON: CPT

## 2025-06-24 PROCEDURE — 99459 PELVIC EXAMINATION: CPT

## 2025-06-25 LAB
BASOPHILS # BLD AUTO: 0.06 K/UL
BASOPHILS NFR BLD AUTO: 0.8 %
CANCER AG125 SERPL-ACNC: 75 U/ML
EOSINOPHIL # BLD AUTO: 0.24 K/UL
EOSINOPHIL NFR BLD AUTO: 3.1 %
FERRITIN SERPL-MCNC: 45 NG/ML
HCT VFR BLD CALC: 38.7 %
HGB BLD-MCNC: 12.2 G/DL
IMM GRANULOCYTES NFR BLD AUTO: 0.1 %
LYMPHOCYTES # BLD AUTO: 1.5 K/UL
LYMPHOCYTES NFR BLD AUTO: 19.4 %
MAN DIFF?: NORMAL
MCHC RBC-ENTMCNC: 31.5 G/DL
MCHC RBC-ENTMCNC: 31.9 PG
MCV RBC AUTO: 101.3 FL
MONOCYTES # BLD AUTO: 0.51 K/UL
MONOCYTES NFR BLD AUTO: 6.6 %
NEUTROPHILS # BLD AUTO: 5.4 K/UL
NEUTROPHILS NFR BLD AUTO: 70 %
PLATELET # BLD AUTO: 250 K/UL
RBC # BLD: 3.82 M/UL
RBC # FLD: 13.5 %
WBC # FLD AUTO: 7.72 K/UL

## 2025-06-30 ENCOUNTER — RX CHANGE (OUTPATIENT)
Age: 70
End: 2025-06-30

## 2025-06-30 RX ORDER — LETROZOLE TABLETS 2.5 MG/1
2.5 TABLET, FILM COATED ORAL
Qty: 90 | Refills: 2 | Status: ACTIVE | COMMUNITY
Start: 1900-01-01 | End: 1900-01-01

## 2025-07-08 ENCOUNTER — APPOINTMENT (OUTPATIENT)
Dept: MAMMOGRAPHY | Facility: CLINIC | Age: 70
End: 2025-07-08
Payer: MEDICARE

## 2025-07-08 ENCOUNTER — RESULT REVIEW (OUTPATIENT)
Age: 70
End: 2025-07-08

## 2025-07-08 PROCEDURE — 77063 BREAST TOMOSYNTHESIS BI: CPT

## 2025-07-08 PROCEDURE — 77067 SCR MAMMO BI INCL CAD: CPT

## 2025-07-29 ENCOUNTER — APPOINTMENT (OUTPATIENT)
Dept: NUCLEAR MEDICINE | Facility: CLINIC | Age: 70
End: 2025-07-29

## 2025-07-29 ENCOUNTER — APPOINTMENT (OUTPATIENT)
Dept: NUCLEAR MEDICINE | Facility: CLINIC | Age: 70
End: 2025-07-29
Payer: MEDICARE

## 2025-07-29 PROCEDURE — A9552: CPT

## 2025-07-29 PROCEDURE — 78815 PET IMAGE W/CT SKULL-THIGH: CPT | Mod: PS

## 2025-07-31 ENCOUNTER — APPOINTMENT (OUTPATIENT)
Dept: THORACIC SURGERY | Facility: CLINIC | Age: 70
End: 2025-07-31
Payer: MEDICARE

## 2025-07-31 VITALS
SYSTOLIC BLOOD PRESSURE: 152 MMHG | DIASTOLIC BLOOD PRESSURE: 112 MMHG | BODY MASS INDEX: 31.18 KG/M2 | HEART RATE: 112 BPM | HEIGHT: 66 IN | OXYGEN SATURATION: 99 % | WEIGHT: 194 LBS

## 2025-07-31 DIAGNOSIS — R91.1 SOLITARY PULMONARY NODULE: ICD-10-CM

## 2025-07-31 PROCEDURE — 99205 OFFICE O/P NEW HI 60 MIN: CPT

## 2025-08-01 ENCOUNTER — APPOINTMENT (OUTPATIENT)
Dept: CT IMAGING | Facility: CLINIC | Age: 70
End: 2025-08-01
Payer: MEDICARE

## 2025-08-01 PROCEDURE — 71260 CT THORAX DX C+: CPT

## 2025-08-11 ENCOUNTER — APPOINTMENT (OUTPATIENT)
Dept: THORACIC SURGERY | Facility: HOSPITAL | Age: 70
End: 2025-08-11
Payer: MEDICARE

## 2025-08-14 ENCOUNTER — APPOINTMENT (OUTPATIENT)
Dept: THORACIC SURGERY | Facility: CLINIC | Age: 70
End: 2025-08-14
Payer: MEDICARE

## 2025-08-14 DIAGNOSIS — R59.0 LOCALIZED ENLARGED LYMPH NODES: ICD-10-CM

## 2025-08-14 PROCEDURE — 99213 OFFICE O/P EST LOW 20 MIN: CPT | Mod: 93

## 2025-09-03 ENCOUNTER — NON-APPOINTMENT (OUTPATIENT)
Age: 70
End: 2025-09-03

## 2025-09-05 ENCOUNTER — APPOINTMENT (OUTPATIENT)
Dept: PULMONOLOGY | Facility: CLINIC | Age: 70
End: 2025-09-05

## 2025-09-05 VITALS
RESPIRATION RATE: 16 BRPM | TEMPERATURE: 97.1 F | DIASTOLIC BLOOD PRESSURE: 111 MMHG | OXYGEN SATURATION: 98 % | BODY MASS INDEX: 32.14 KG/M2 | HEIGHT: 66 IN | HEART RATE: 98 BPM | SYSTOLIC BLOOD PRESSURE: 160 MMHG | WEIGHT: 200 LBS

## 2025-09-05 DIAGNOSIS — R59.0 LOCALIZED ENLARGED LYMPH NODES: ICD-10-CM

## 2025-09-05 DIAGNOSIS — R91.1 SOLITARY PULMONARY NODULE: ICD-10-CM

## 2025-09-05 PROCEDURE — 36415 COLL VENOUS BLD VENIPUNCTURE: CPT

## 2025-09-05 PROCEDURE — 99205 OFFICE O/P NEW HI 60 MIN: CPT

## 2025-09-17 ENCOUNTER — NON-APPOINTMENT (OUTPATIENT)
Age: 70
End: 2025-09-17

## (undated) DEVICE — DRAPE 3/4 SHEET 52X76"

## (undated) DEVICE — PREP BETADINE KIT

## (undated) DEVICE — SUT VICRYL 0 18" TIES UNDYED

## (undated) DEVICE — PACK HEAD & NECK

## (undated) DEVICE — GLV 6.5 PROTEXIS W HYDROGEL

## (undated) DEVICE — POSITIONER FOAM EGG CRATE ULNAR 2PCS (PINK)

## (undated) DEVICE — SYR ASEPTO

## (undated) DEVICE — BIPOLAR FORCEP KIRWAN JEWELERS STR 4" X 0.4MM W 12FT CORD (GREEN)

## (undated) DEVICE — VENODYNE/SCD SLEEVE CALF MEDIUM

## (undated) DEVICE — PACK GENERAL LAPAROSCOPY

## (undated) DEVICE — TROCAR COVIDIEN BLUNT TIP HASSAN 10MM

## (undated) DEVICE — CANISTER DISPOSABLE THIN WALL 3000CC

## (undated) DEVICE — PROTECTOR HEEL / ELBOW FLUFFY

## (undated) DEVICE — GOWN LG

## (undated) DEVICE — GLV 6.5 PROTEXIS (BLUE)

## (undated) DEVICE — LAP PAD W RING 18 X 18"

## (undated) DEVICE — ENDOCATCH 10MM SPECIMEN POUCH

## (undated) DEVICE — DRSG STERISTRIPS 0.5 X 4"

## (undated) DEVICE — WARMING BLANKET UPPER ADULT

## (undated) DEVICE — POSITIONER PINK PAD PIGAZZI SYSTEM

## (undated) DEVICE — DRAPE TOWEL BLUE 17" X 24"

## (undated) DEVICE — UTERINE MANIPULATOR COOPER SURGICAL 5MM 33CM GREEN

## (undated) DEVICE — DRAPE FLUID WARMER 44 X 44"

## (undated) DEVICE — TROCAR GELPOINT MINI ADVANCED

## (undated) DEVICE — ELCTR BOVIE PENCIL BLADE 10FT

## (undated) DEVICE — PREP BETADINE SPONGE STICKS

## (undated) DEVICE — SUT CHROMIC 3-0 27" SH

## (undated) DEVICE — TUBING OLYMPUS INSUFFLATION

## (undated) DEVICE — SUT VICRYL 3-0 18" TIES UNDYED

## (undated) DEVICE — DRAPE MAGNETIC INSTRUMENT MEDIUM

## (undated) DEVICE — ELCTR BOVIE PENCIL SMOKE EVACUATION

## (undated) DEVICE — SOL IRR POUR NS 0.9% 500ML

## (undated) DEVICE — TROCAR COVIDIEN BLUNT TIP HASSAN 10MM STANDARD

## (undated) DEVICE — ELCTR GROUNDING PAD ADULT COVIDIEN

## (undated) DEVICE — SUT MONOCRYL 4-0 27" PS-2 UNDYED

## (undated) DEVICE — SUT VICRYL 4-0 18" PS-2 UNDYED

## (undated) DEVICE — DRSG BENZOIN 0.6CC

## (undated) DEVICE — D HELP - CLEARVIEW CLEARIFY SYSTEM

## (undated) DEVICE — TUBING HYDRO-SURG PLUS IRRIGATOR W SMOKEVAC & PROBE

## (undated) DEVICE — DRAPE LAPAROTOMY TRANSVERSE

## (undated) DEVICE — DRAPE INSTRUMENT POUCH 6.75" X 11"

## (undated) DEVICE — DRSG OPSITE 2.5 X 2"

## (undated) DEVICE — CANISTER SUCTION 2000CC

## (undated) DEVICE — LIGASURE BLUNT TIP 37CM

## (undated) DEVICE — VALVE YELLOW PORT SEAL PLUS 5MM

## (undated) DEVICE — FOLEY TRAY 16FR 5CC LF UMETER CLOSED

## (undated) DEVICE — SUT POLYSORB 0 30" GS-23

## (undated) DEVICE — TUBING RAPIDVAC SMOKE EVACUATOR .25" X 10FT

## (undated) DEVICE — DRAIN JACKSON PRATT 7MM FLAT FULL NO TROCAR

## (undated) DEVICE — LIGASURE EXACT DISSECTOR

## (undated) DEVICE — POSITIONER PURPLE ARM ONE STEP (LARGE)

## (undated) DEVICE — LABELS BLANK W PEN

## (undated) DEVICE — PACK PERI GYN

## (undated) DEVICE — SUT VICRYL 2-0 18" TIES UNDYED

## (undated) DEVICE — SUT ETHILON 3-0 18" FS-1

## (undated) DEVICE — DRSG XEROFORM 5 X 9"

## (undated) DEVICE — DRAPE UNDER BUTTOCKS W SCREEN

## (undated) DEVICE — SOL IRR BAG H2O 2000ML

## (undated) DEVICE — VISITEC 4X4